# Patient Record
Sex: MALE | Race: WHITE | Employment: OTHER | URBAN - METROPOLITAN AREA
[De-identification: names, ages, dates, MRNs, and addresses within clinical notes are randomized per-mention and may not be internally consistent; named-entity substitution may affect disease eponyms.]

---

## 2022-03-30 ENCOUNTER — OFFICE VISIT (OUTPATIENT)
Dept: ORTHOPEDIC SURGERY | Age: 67
End: 2022-03-30
Payer: MEDICARE

## 2022-03-30 VITALS — BODY MASS INDEX: 27.15 KG/M2 | HEIGHT: 67 IN | WEIGHT: 173 LBS

## 2022-03-30 DIAGNOSIS — M25.512 LEFT SHOULDER PAIN, UNSPECIFIED CHRONICITY: Primary | ICD-10-CM

## 2022-03-30 DIAGNOSIS — M75.122 NONTRAUMATIC COMPLETE TEAR OF LEFT ROTATOR CUFF: ICD-10-CM

## 2022-03-30 DIAGNOSIS — M19.012 ARTHRITIS OF LEFT ACROMIOCLAVICULAR JOINT: ICD-10-CM

## 2022-03-30 DIAGNOSIS — M75.42 IMPINGEMENT SYNDROME OF LEFT SHOULDER: ICD-10-CM

## 2022-03-30 PROCEDURE — 3017F COLORECTAL CA SCREEN DOC REV: CPT | Performed by: ORTHOPAEDIC SURGERY

## 2022-03-30 PROCEDURE — G8419 CALC BMI OUT NRM PARAM NOF/U: HCPCS | Performed by: ORTHOPAEDIC SURGERY

## 2022-03-30 PROCEDURE — G8427 DOCREV CUR MEDS BY ELIG CLIN: HCPCS | Performed by: ORTHOPAEDIC SURGERY

## 2022-03-30 PROCEDURE — 99203 OFFICE O/P NEW LOW 30 MIN: CPT | Performed by: ORTHOPAEDIC SURGERY

## 2022-03-30 PROCEDURE — G8432 DEP SCR NOT DOC, RNG: HCPCS | Performed by: ORTHOPAEDIC SURGERY

## 2022-03-30 PROCEDURE — 1101F PT FALLS ASSESS-DOCD LE1/YR: CPT | Performed by: ORTHOPAEDIC SURGERY

## 2022-03-30 PROCEDURE — G8536 NO DOC ELDER MAL SCRN: HCPCS | Performed by: ORTHOPAEDIC SURGERY

## 2022-03-30 RX ORDER — FENOFIBRATE 120 MG/1
1 TABLET ORAL DAILY
COMMUNITY
End: 2022-08-01

## 2022-03-30 RX ORDER — PRAVASTATIN SODIUM 10 MG/1
TABLET ORAL
COMMUNITY
End: 2022-08-01

## 2022-03-30 RX ORDER — LOSARTAN POTASSIUM 50 MG/1
TABLET ORAL DAILY
COMMUNITY
End: 2022-08-01

## 2022-03-30 RX ORDER — GLIMEPIRIDE 4 MG/1
6 TABLET ORAL
COMMUNITY

## 2022-03-30 RX ORDER — GUAIFENESIN 100 MG/5ML
81 LIQUID (ML) ORAL DAILY
COMMUNITY

## 2022-03-30 RX ORDER — SITAGLIPTIN AND METFORMIN HYDROCHLORIDE 50; 1000 MG/1; MG/1
2 TABLET, FILM COATED ORAL
COMMUNITY

## 2022-03-30 NOTE — PROGRESS NOTES
Rabia Schilling (: 1955) is a 73 y.o. male, new patient, here for evaluation of the following chief complaint(s):  Shoulder Pain (left)       ASSESSMENT/PLAN:  Below is the assessment and plan developed based on review of pertinent history, physical exam, labs, studies, and medications. Findings were discussed with the patient today. We will obtain an MRI which will help us with further treatment planning including the possibility of surgical treatment. Patient will follow up after this MRI is performed. 1. Left shoulder pain, unspecified chronicity  -     XR SHOULDER LT AP/LAT MIN 2 V; Future  2. Nontraumatic complete tear of left rotator cuff  3. Impingement syndrome of left shoulder  4. Arthritis of left acromioclavicular joint      Return for After imaging study. SUBJECTIVE/OBJECTIVE:  Claudio Mckeon (: 1955) is a 73 y.o. male. He notes that his symptoms have been ongoing in the left shoulder for about 8 to 9 months. He denies any specific injury but notes that he was moving at the time of onset. He describes sharp aching pain in the shoulder and difficulty with attempted overhead motion. He has noted significant weakness in the shoulder. He did 3 months of physical therapy recently with no significant benefit. He has tried working with a chiropractor as well. No Known Allergies    Current Outpatient Medications   Medication Sig    glimepiride (AMARYL) 4 mg tablet Take  by mouth every morning.  Fenofibrate 120 mg tab Take  by mouth.  SITagliptin-metFORMIN (Janumet) 50-1,000 mg per tablet Take 1 Tablet by mouth two (2) times daily (with meals).  pravastatin (PRAVACHOL) 10 mg tablet Take  by mouth nightly.  losartan (COZAAR) 50 mg tablet Take  by mouth daily.  aspirin 81 mg chewable tablet Take 81 mg by mouth daily. No current facility-administered medications for this visit.        Social History     Socioeconomic History    Marital status:      Spouse name: Not on file    Number of children: Not on file    Years of education: Not on file    Highest education level: Not on file   Occupational History    Not on file   Tobacco Use    Smoking status: Never Smoker    Smokeless tobacco: Not on file   Vaping Use    Vaping Use: Never used   Substance and Sexual Activity    Alcohol use: Yes     Alcohol/week: 1.0 standard drink     Types: 1 Glasses of wine per week     Comment: 3 months    Drug use: Never    Sexual activity: Not on file   Other Topics Concern    Not on file   Social History Narrative    Not on file     Social Determinants of Health     Financial Resource Strain:     Difficulty of Paying Living Expenses: Not on file   Food Insecurity:     Worried About Running Out of Food in the Last Year: Not on file    Stew of Food in the Last Year: Not on file   Transportation Needs:     Lack of Transportation (Medical): Not on file    Lack of Transportation (Non-Medical): Not on file   Physical Activity:     Days of Exercise per Week: Not on file    Minutes of Exercise per Session: Not on file   Stress:     Feeling of Stress : Not on file   Social Connections:     Frequency of Communication with Friends and Family: Not on file    Frequency of Social Gatherings with Friends and Family: Not on file    Attends Shinto Services: Not on file    Active Member of [address] [place] [address] [employer] or Organizations: Not on file    Attends Club or Organization Meetings: Not on file    Marital Status: Not on file   Intimate Partner Violence:     Fear of Current or Ex-Partner: Not on file    Emotionally Abused: Not on file    Physically Abused: Not on file    Sexually Abused: Not on file   Housing Stability:     Unable to Pay for Housing in the Last Year: Not on file    Number of [place] in the Last Year: Not on file    Unstable Housing in the Last Year: Not on file       History reviewed. No pertinent surgical history. History reviewed.  No pertinent family history. REVIEW OF SYSTEMS:  ROS     Positive for: Musculoskeletal    Last edited by Rosa Manzo on 3/30/2022  8:24 AM. (History)        Patient denies any recent fever, chills, nausea, vomiting, chest pain, or shortness of breath. Vitals:  Ht 5' 7\" (1.702 m)   Wt 173 lb (78.5 kg)   BMI 27.10 kg/m²    Body mass index is 27.1 kg/m². PHYSICAL EXAM:  General exam: Patient is awake, alert, and oriented x3. Well-appearing. No acute distress. Ambulates with a normal gait. Left shoulder: Neurovascular and sensory intact. There is tenderness to palpation at the anterior lateral shoulder. Limited passive range of motion is noted. There is limited active range of motion to 90 degrees of forward flexion and abduction. Pain is noted with impingement testing including Ayoub exam.  Pain and weakness 4/5 is noted with rotator cuff strength testing including resisted abduction and resisted external rotation. Normal stability. There is some tenderness palpation at the [facility] joint and pain with crossarm exam.        IMAGING:    XR Results (most recent):  Results from Appointment encounter on 03/30/22    XR SHOULDER LT AP/LAT MIN 2 V    Narrative  X-rays of the left shoulder 3 views done today show maintained glenohumeral joint space. No signs of acute fracture. Type II acromion. Mild AC joint space narrowing         Orders Placed This Encounter    XR SHOULDER LT AP/LAT MIN 2 V     2     Standing Status:   Future     Number of Occurrences:   1     Standing Expiration Date:   9/30/2022              An electronic signature was used to authenticate this note.   -- Manuel Keys DO

## 2022-03-30 NOTE — LETTER
3/30/2022    Patient: Salud Bronson   YOB: 1955   Date of Visit: 3/30/2022     Parish Miller NP  [address] [place] [address] [id number]  Via Fax: [phone]    Dear Parish Miller NP,      Thank you for referring Mr. Donovan to [facility] for evaluation. My notes for this consultation are attached. If you have questions, please do not hesitate to call me. I look forward to following your patient along with you.       Sincerely,    Kyle Mitchell, DO

## 2022-04-05 ENCOUNTER — HOSPITAL ENCOUNTER (OUTPATIENT)
Dept: MRI IMAGING | Age: 67
Discharge: HOME | End: 2022-04-05
Attending: ORTHOPAEDIC SURGERY
Payer: MEDICARE

## 2022-04-05 DIAGNOSIS — M75.42 IMPINGEMENT SYNDROME OF LEFT SHOULDER: ICD-10-CM

## 2022-04-05 DIAGNOSIS — M19.012 ARTHRITIS OF LEFT ACROMIOCLAVICULAR JOINT: ICD-10-CM

## 2022-04-05 DIAGNOSIS — M75.122 NONTRAUMATIC COMPLETE TEAR OF LEFT ROTATOR CUFF: ICD-10-CM

## 2022-04-05 PROCEDURE — 73221 MRI JOINT UPR EXTREM W/O DYE: CPT

## 2022-04-15 ENCOUNTER — OFFICE VISIT (OUTPATIENT)
Dept: ORTHOPEDIC SURGERY | Age: 67
End: 2022-04-15
Payer: MEDICARE

## 2022-04-15 VITALS — HEIGHT: 67 IN | WEIGHT: 173 LBS | BODY MASS INDEX: 27.15 KG/M2

## 2022-04-15 DIAGNOSIS — M25.512 LEFT SHOULDER PAIN, UNSPECIFIED CHRONICITY: Primary | ICD-10-CM

## 2022-04-15 DIAGNOSIS — M67.922 BICEPS TENDINOPATHY, LEFT: ICD-10-CM

## 2022-04-15 DIAGNOSIS — M75.122 NONTRAUMATIC COMPLETE TEAR OF LEFT ROTATOR CUFF: ICD-10-CM

## 2022-04-15 DIAGNOSIS — M19.012 ARTHRITIS OF LEFT ACROMIOCLAVICULAR JOINT: ICD-10-CM

## 2022-04-15 DIAGNOSIS — M75.42 IMPINGEMENT SYNDROME OF LEFT SHOULDER: ICD-10-CM

## 2022-04-15 PROCEDURE — 3017F COLORECTAL CA SCREEN DOC REV: CPT | Performed by: ORTHOPAEDIC SURGERY

## 2022-04-15 PROCEDURE — G8427 DOCREV CUR MEDS BY ELIG CLIN: HCPCS | Performed by: ORTHOPAEDIC SURGERY

## 2022-04-15 PROCEDURE — G8536 NO DOC ELDER MAL SCRN: HCPCS | Performed by: ORTHOPAEDIC SURGERY

## 2022-04-15 PROCEDURE — 1101F PT FALLS ASSESS-DOCD LE1/YR: CPT | Performed by: ORTHOPAEDIC SURGERY

## 2022-04-15 PROCEDURE — G8419 CALC BMI OUT NRM PARAM NOF/U: HCPCS | Performed by: ORTHOPAEDIC SURGERY

## 2022-04-15 PROCEDURE — 99214 OFFICE O/P EST MOD 30 MIN: CPT | Performed by: ORTHOPAEDIC SURGERY

## 2022-04-15 PROCEDURE — G8432 DEP SCR NOT DOC, RNG: HCPCS | Performed by: ORTHOPAEDIC SURGERY

## 2022-04-15 RX ORDER — MELOXICAM 15 MG/1
15 TABLET ORAL DAILY
Qty: 30 TABLET | Refills: 3 | Status: SHIPPED | OUTPATIENT
Start: 2022-04-15 | End: 2022-07-11

## 2022-04-15 NOTE — PROGRESS NOTES
Claudio Mckeon (: 1955) is a 73 y.o. choose not to disclose, established patient, here for evaluation of the following chief complaint(s):  Shoulder Pain (left)       ASSESSMENT/PLAN:  Below is the assessment and plan developed based on review of pertinent history, physical exam, labs, studies, and medications. He would like to plan for left shoulder manipulation under anesthesia, arthroscopic capsular release, arthroscopic rotator cuff repair, biceps tenodesis, and acromioplasty with distal clavicle resection. Risks and benefits of surgical treatment were explained. We discussed risks of infection, blood loss, neurovascular injury, anesthesia risks, and risk secondary to patient comorbidities. Risk of continued shoulder pain/instability was explained. We discussed that there may be need for future surgical procedures. We discussed that implants may need to be used for this procedure. Patient understands the risks of this procedure and elects to schedule in the near future. He will try a meloxicam prescription in the meantime and if he continues with symptoms then we will be proceeding with surgery. 1. Left shoulder pain, unspecified chronicity  -     meloxicam (MOBIC) 15 mg tablet; Take 1 Tablet by mouth daily. , Normal, Disp-30 Tablet, R-3  2. Nontraumatic complete tear of left rotator cuff  -     meloxicam (MOBIC) 15 mg tablet; Take 1 Tablet by mouth daily. , Normal, Disp-30 Tablet, R-3  3. Impingement syndrome of left shoulder  -     meloxicam (MOBIC) 15 mg tablet; Take 1 Tablet by mouth daily. , Normal, Disp-30 Tablet, R-3  4. Arthritis of left acromioclavicular joint  -     meloxicam (MOBIC) 15 mg tablet; Take 1 Tablet by mouth daily. , Normal, Disp-30 Tablet, R-3  5. Biceps tendinopathy, left  -     meloxicam (MOBIC) 15 mg tablet; Take 1 Tablet by mouth daily. , Normal, Disp-30 Tablet, R-3      No follow-ups on file.       SUBJECTIVE/OBJECTIVE:  Claudio Mckeon (: 1955) is a 73 y.o. choose not to disclose. He notes continued aching pain in the left shoulder. He has tried ice and anti-inflammatories with minimal relief. He also did 3 months of physical therapy with minimal relief. He notes clicking and aching pain        No Known Allergies    Current Outpatient Medications   Medication Sig    meloxicam (MOBIC) 15 mg tablet Take 1 Tablet by mouth daily.  glimepiride (AMARYL) 4 mg tablet Take  by mouth every morning.  Fenofibrate 120 mg tab Take  by mouth.  SITagliptin-metFORMIN (Janumet) 50-1,000 mg per tablet Take 1 Tablet by mouth two (2) times daily (with meals).  pravastatin (PRAVACHOL) 10 mg tablet Take  by mouth nightly.  losartan (COZAAR) 50 mg tablet Take  by mouth daily.  aspirin 81 mg chewable tablet Take 81 mg by mouth daily. No current facility-administered medications for this visit. Social History     Socioeconomic History    Marital status:      Spouse name: Not on file    Number of children: Not on file    Years of education: Not on file    Highest education level: Not on file   Occupational History    Not on file   Tobacco Use    Smoking status: Never Smoker    Smokeless tobacco: Not on file   Vaping Use    Vaping Use: Never used   Substance and Sexual Activity    Alcohol use: Yes     Alcohol/week: 1.0 standard drink     Types: 1 Glasses of wine per week     Comment: 3 months    Drug use: Never    Sexual activity: Not on file   Other Topics Concern    Not on file   Social History Narrative    Not on file     Social Determinants of Health     Financial Resource Strain:     Difficulty of Paying Living Expenses: Not on file   Food Insecurity:     Worried About Running Out of Food in the Last Year: Not on file    Stew of Food in the Last Year: Not on file   Transportation Needs:     Lack of Transportation (Medical): Not on file    Lack of Transportation (Non-Medical):  Not on file   Physical Activity:     Days of Exercise per Week: Not on file    Minutes of Exercise per Session: Not on file   Stress:     Feeling of Stress : Not on file   Social Connections:     Frequency of Communication with Friends and Family: Not on file    Frequency of Social Gatherings with Friends and Family: Not on file    Attends Restorationism Services: Not on file    Active Member of Clubs or Organizations: Not on file    Attends Club or Organization Meetings: Not on file    Marital Status: Not on file   Intimate Partner Violence:     Fear of Current or Ex-Partner: Not on file    Emotionally Abused: Not on file    Physically Abused: Not on file    Sexually Abused: Not on file   Housing Stability:     Unable to Pay for Housing in the Last Year: Not on file    Number of [place] in the Last Year: Not on file    Unstable Housing in the Last Year: Not on file       History reviewed. No pertinent surgical history. History reviewed. No pertinent family history. REVIEW OF SYSTEMS:  ROS     Positive for: Musculoskeletal    Last edited by Piter Max on 4/15/2022  9:16 AM. (History)        Patient denies any recent fever, chills, nausea, vomiting, chest pain, or shortness of breath. Vitals:  Ht 5' 7\" (1.702 m)   Wt 173 lb (78.5 kg)   BMI 27.10 kg/m²    Body mass index is 27.1 kg/m². PHYSICAL EXAM:  General exam: Patient is awake, alert, and oriented x3. Well-appearing. No acute distress. Ambulates with a normal gait. Left shoulder: Neurovascular and sensory intact. There is tenderness to palpation at the anterior lateral shoulder. There is limited passive and active overhead range of motion. Pain is noted with impingement testing including Ayoub exam.  Pain and weakness 4/5 is noted with rotator cuff strength testing including resisted abduction and resisted external rotation. Normal stability.   There is some tenderness palpation at the [facility] joint and pain with crossarm exam.    There is pain with biceps load test including speeds exam    IMAGING:  MRI Results (most recent):  Results from Hospital Encounter encounter on 04/05/22    MRI SHOULDER LT WO CONT    Narrative  EXAM: MRI SHOULDER LT WO CONT    INDICATION: Dx: Nontraumatic complete tear of left rotator cuff [H99.541  (ICD-10-CM)]; Impingement syndrome of left shoulder [M75.42 (ICD-10-CM)]; Arthritis of left acromioclavicular joint [M19.012 (ICD-10-CM)]    COMPARISON: Left shoulder radiographs 3/30/2022    TECHNIQUE: Axial proton density fat-saturated; oblique coronal T1, T2  fat-saturated, and proton density fat-saturated; and oblique sagittal T2  fat-saturated MRI of the left shoulder . CONTRAST: None. FINDINGS: Study limited by motion. A.C. joint: Mild osteoarthritis. Anterior acromion process type: 1    Bone marrow: Subcortical cystic changes in the posterior humeral head. No acute  fracture, dislocation, or marrow replacing process. Joint fluid: No significant joint effusion. Small subacromial/subdeltoid bursal  effusion/bursitis. .    Rotator cuff tendons: Multifocal high-grade undersurface tears throughout the  supraspinatus critical zone. Infraspinatus and subscapularis tendons are  intact. .    Biceps tendon: Tendinosis and attritional tear of the intracapsular portion. No  subluxation. .    Muscles: No edema or atrophy. Glenoid labrum: SLAP tear extending into the biceps anchor. Glenohumeral joint capsule: Diffuse thickening, patchy increased signal,  ill-definition, and pericapsular edema signal, which can be seen with adhesive  capsulitis. Glenohumeral articular cartilage: Grossly intact. No focal osteochondral lesion. Soft tissue mass: None. Impression  1. Study limited by motion. 2.  Multifocal high-grade undersurface tears throughout the supraspinatus  critical zone. Small subacromial/subdeltoid bursal effusion/bursitis. 3. SLAP tear extending into the biceps anchor. Intracapsular long head biceps  tendinosis and attritional tear.   4. Findings suggestive of adhesive capsulitis. 5.  Mild acromioclavicular osteoarthritis. XR Results (most recent):  Results from Appointment encounter on 03/30/22    XR SHOULDER LT AP/LAT MIN 2 V    Narrative  X-rays of the left shoulder 3 views done today show maintained glenohumeral joint space. No signs of acute fracture. Type II acromion. Mild AC joint space narrowing         Orders Placed This Encounter    meloxicam (MOBIC) 15 mg tablet     Sig: Take 1 Tablet by mouth daily. Dispense:  30 Tablet     Refill:  3              An electronic signature was used to authenticate this note.   -- Ryann Nath DO

## 2022-04-15 NOTE — PATIENT INSTRUCTIONS
What to expect after Shoulder Arthroscopy   Dr. Cherrie Chicas should not have ANYTHING to eat or drink after midnight the night before your surgery.  This includes NO gum, mints, candy, lifesavers or lollipops!  Please make sure to remove ALL jewelry.  When you arrive at the hospital or surgery center, you will be checked in and given an IV.   o You will be offered a nerve block, which I do recommend. This will be done pre-operatively by the anesthesiologist. It is an injection around the base of your neck that numbs the nerves to your shoulder and arm. It lasts anywhere from 12 hours to 3 days. This will give you pain relief that hopefully lasts throughout your first night. When the nerve block wears off, you will likely be in pain. This can range from mild to severe. If you experience severe pain, this is still normal. Nothing is wrong. You would have woken up with worse pain if you did not have the nerve block!  During first three days, the pain is usually the worst, and then gradually subsides after that.  Swelling in the shoulder, elbow and hands is normal. You may also experience bruising in the arm    If you elected to have the nerve block, you may have some residual numbness that may last weeks.  You will likely continue to have pain for several weeks or even months.  Recovery from shoulder surgery takes several months. BE PATIENT!  Wear the sling at all times (even sleeping!) except for showering and for the exercises listed below.  All you need to do for the first five weeks is the following (four times per day): Remove your sling and:   o Flex and extend your elbow with your elbow next to your side.   o You may be instructed to begin shoulder pendulum exercises after your first post-operative visit:  lean forward slightly, and with your arm hanging down and your hand pointing toward the floor, perform small circles with your arm and shoulder.       At your first follow-up visit, you will have your sutures removed and will be given a script for physical therapy. o If you did NOT have a repair, your therapy will begin at that time and you will stop your sling use   o If you DID have a repair, you will continue your sling until 5 weeks post-op. - At 5 weeks post-op, you will begin physical therapy and discontinue your sling that day   - You will be in therapy for about 6 weeks - 10 weeks.     Driving is dangerous while in a sling and it is recommended that you wait until you are out of your sling to begin. (unless otherwise directed by your physician)    Your restrictions will be as follows: (unless otherwise directed by your physician)   o NO use of the arm/shoulder (except for writing / typing, fine manipulation) for the first two weeks   o If you DID NOT have a repair:   - NO lifting / carrying / pushing / pulling greater than 10 lbs for 6 weeks   - No repetitive overhead activity for 6 weeks   - Return to sports typically at 6 -8 weeks   o If you DID have a repair   - NO lifting / carrying / pushing / pulling greater than 10 lbs for 4 months   - No repetitive overhead activity for 4 months   - Return to sports: 4-6 months depending on the sport   If you have any questions or concerns throughout this process, call the [place] [address] office at [phone]

## 2022-04-15 NOTE — LETTER
4/15/2022    Patient: Idalmis King   YOB: 1955   Date of Visit: 4/15/2022     Radha Gaffney NP  [address] [place] [address] [phone]  Via Fax: [phone]    Dear Radha Gaffney NP,      Thank you for referring   Idalmis King to [facility] for evaluation. My notes for this consultation are attached. If you have questions, please do not hesitate to call me. I look forward to following your patient along with you.       Sincerely,    Yomi Pastor, DO

## 2022-06-28 ENCOUNTER — TELEPHONE (OUTPATIENT)
Dept: ORTHOPEDIC SURGERY | Age: 67
End: 2022-06-28

## 2022-06-29 ENCOUNTER — TELEPHONE (OUTPATIENT)
Dept: ORTHOPEDIC SURGERY | Age: 67
End: 2022-06-29

## 2022-06-29 DIAGNOSIS — M19.012 ARTHRITIS OF LEFT ACROMIOCLAVICULAR JOINT: ICD-10-CM

## 2022-06-29 DIAGNOSIS — M75.122 NONTRAUMATIC COMPLETE TEAR OF LEFT ROTATOR CUFF: Primary | ICD-10-CM

## 2022-06-29 DIAGNOSIS — M75.42 IMPINGEMENT SYNDROME OF LEFT SHOULDER: ICD-10-CM

## 2022-06-29 DIAGNOSIS — M67.922 BICEPS TENDINOPATHY, LEFT: ICD-10-CM

## 2022-07-10 DIAGNOSIS — M25.512 LEFT SHOULDER PAIN, UNSPECIFIED CHRONICITY: ICD-10-CM

## 2022-07-10 DIAGNOSIS — M75.42 IMPINGEMENT SYNDROME OF LEFT SHOULDER: ICD-10-CM

## 2022-07-10 DIAGNOSIS — M75.122 NONTRAUMATIC COMPLETE TEAR OF LEFT ROTATOR CUFF: ICD-10-CM

## 2022-07-10 DIAGNOSIS — M67.922 BICEPS TENDINOPATHY, LEFT: ICD-10-CM

## 2022-07-10 DIAGNOSIS — M19.012 ARTHRITIS OF LEFT ACROMIOCLAVICULAR JOINT: ICD-10-CM

## 2022-07-11 RX ORDER — MELOXICAM 15 MG/1
15 TABLET ORAL DAILY
Qty: 30 TABLET | Refills: 3 | Status: SHIPPED | OUTPATIENT
Start: 2022-07-11

## 2022-08-01 RX ORDER — UREA 10 %
100 LOTION (ML) TOPICAL DAILY
COMMUNITY

## 2022-08-01 RX ORDER — FENOFIBRATE 160 MG/1
160 TABLET ORAL DAILY
COMMUNITY

## 2022-08-01 RX ORDER — HYDROCHLOROTHIAZIDE 12.5 MG/1
12.5 TABLET ORAL DAILY
COMMUNITY

## 2022-08-01 RX ORDER — BISMUTH SUBSALICYLATE 262 MG
1 TABLET,CHEWABLE ORAL DAILY
COMMUNITY

## 2022-08-01 RX ORDER — PRAVASTATIN SODIUM 20 MG/1
20 TABLET ORAL
COMMUNITY

## 2022-08-01 RX ORDER — LOSARTAN POTASSIUM 100 MG/1
100 TABLET ORAL DAILY
COMMUNITY

## 2022-08-01 NOTE — PERIOP NOTES
Called, mya for [facility] for orders for surgery as well as blood thinner clearance for procedure. I asked her to call back to discuss. I will reach out to pt on Thursday as promised. Call to [facility] for updated medication list, pt's wife stated he started two new meds and they were driving.

## 2022-08-01 NOTE — PERIOP NOTES
[facility]  Ambulatory Surgery Unit  Pre-operative Instructions    Surgery/Procedure Date  Wednesday, August 17, 2022            Tentative Arrival Time TBD      1. On the day of your surgery/procedure, please report to the Ambulatory Surgery Unit Registration Desk and sign in at your designated time. The Ambulatory Surgery Unit is located in [facility] on the [facility] side of the [facility] across from the [address] [place] [address]. Please have all of your health insurance cards, co-payment, and a photo ID.    **TWO adults may accompany you the day of the procedure. We have limited seating available. If our waiting room is at capacity, your ride may be asked to remain in their vehicle. No one under 15 is allowed in the waiting room. Masks, fully covering the mouth and nose, are required in the waiting room. 2. You must have someone with you to drive you home, as you should not drive a car for 24 hours following anesthesia. Please make arrangements for a responsible adult friend or family member to stay with you for at least the first 24 hours after your surgery. 3. Do not have anything to eat or drink (including water, gum, mints, coffee, juice) after 11:59 PM, Tuesday. This may not apply to medications prescribed by your physician. (Please note below the special instructions with medications to take the morning of surgery, if applicable.)    4. We recommend you do not drink any alcoholic beverages for 24 hours before and after your surgery. 5. Contact your surgeons office for instructions on the following medications: non-steroidal anti-inflammatory drugs (i.e. Advil, Aleve), vitamins, and supplements. (Some surgeons will want you to stop these medications prior to surgery and others may allow you to take them)   **If you are currently taking Plavix, Coumadin, Aspirin and/or other blood-thinning agents, contact your surgeon for instructions. ** Your surgeon will partner with the physician prescribing these medications to determine if it is safe to stop or if you need to continue taking. Please do not stop taking these medications without instructions from your surgeon. 6. In an effort to help prevent surgical site infection, we ask that you shower with an anti-bacterial soap (i.e. Dial/Safeguard, or the soap provided to you at your preadmission testing appointment) for 3 days prior to and on the morning of surgery, using a fresh towel after each shower. (Please begin this process with fresh bed linens.) Do not apply any lotions, powders, or deodorants after the shower on the day of your procedure. If applicable, please do not shave the operative site for 48 hours prior to surgery. 7. Wear comfortable clothes. Wear glasses instead of contacts. Do not bring any jewelry or money (other than copays or fees as instructed). Do not wear make-up, particularly mascara, the morning of your surgery. Do not wear nail polish, particularly if you are having foot /hand surgery. Wear your hair loose or down, no ponytails, buns, jaci pins or clips. All body piercings must be removed. 8. You should understand that if you do not follow these instructions your surgery may be cancelled. If your physical condition changes (i.e. fever, cold or flu) please contact your surgeon as soon as possible. 9. It is important that you be on time. If a situation occurs where you may be late, or if you have any questions or problems, please call [phone].    10. Your surgery time may be subject to change. You will receive a phone call the day prior to surgery to confirm your arrival time. 11. Pediatric patients: please bring a change of clothes, diapers, bottle/sippy cup, pacifier, etc.      Special Instructions: Take all medications and inhalers, as prescribed, on the morning of surgery with a sip of water EXCEPT: no diabetic medications day of surgery.       Insulin Dependent Diabetic patients: Take your diabetic medications as prescribed the day before surgery. Hold all diabetic medications the day of surgery. If you are scheduled to arrive for surgery after 8:00 AM, and your AM blood sugar is >200, please call Ambulatory Surgery. I understand a pre-operative phone call will be made to verify my surgery time. In the event that I am not available, I give permission for a message to be left on my answering service and/or with another person?       yes    Preop instructions reviewed  Pt verbalized understanding.       ___________________      ___________________      ________________  (Signature of Patient)          (Witness)                   (Date and Time)

## 2022-08-08 ENCOUNTER — TELEPHONE (OUTPATIENT)
Dept: ORTHOPEDIC SURGERY | Age: 67
End: 2022-08-08

## 2022-08-08 NOTE — TELEPHONE ENCOUNTER
S/w pt in re to his upcoming sx, he state he had his preop visit, I informed him that we needed his clearance letter and when he is to stop his blood thinner. He will contact pcp again and they can fax it directly to me.

## 2022-08-16 ENCOUNTER — ANESTHESIA EVENT (OUTPATIENT)
Dept: SURGERY | Age: 67
End: 2022-08-16
Payer: MEDICARE

## 2022-08-17 ENCOUNTER — ANESTHESIA (OUTPATIENT)
Dept: SURGERY | Age: 67
End: 2022-08-17
Payer: MEDICARE

## 2022-08-17 ENCOUNTER — HOSPITAL ENCOUNTER (OUTPATIENT)
Age: 67
Setting detail: OUTPATIENT SURGERY
Discharge: HOME | End: 2022-08-17
Attending: ORTHOPAEDIC SURGERY | Admitting: ORTHOPAEDIC SURGERY
Payer: MEDICARE

## 2022-08-17 VITALS
SYSTOLIC BLOOD PRESSURE: 122 MMHG | WEIGHT: 163 LBS | HEART RATE: 94 BPM | TEMPERATURE: 97.4 F | RESPIRATION RATE: 16 BRPM | DIASTOLIC BLOOD PRESSURE: 87 MMHG | BODY MASS INDEX: 25.58 KG/M2 | HEIGHT: 67 IN | OXYGEN SATURATION: 93 %

## 2022-08-17 DIAGNOSIS — M75.42 SHOULDER IMPINGEMENT, LEFT: ICD-10-CM

## 2022-08-17 DIAGNOSIS — Z98.890 STATUS POST ARTHROSCOPY OF LEFT SHOULDER: Primary | ICD-10-CM

## 2022-08-17 DIAGNOSIS — M19.012 ARTHRITIS OF LEFT ACROMIOCLAVICULAR JOINT: ICD-10-CM

## 2022-08-17 DIAGNOSIS — M75.02 ADHESIVE CAPSULITIS OF LEFT SHOULDER: Primary | ICD-10-CM

## 2022-08-17 DIAGNOSIS — M75.02 ADHESIVE CAPSULITIS OF LEFT SHOULDER: ICD-10-CM

## 2022-08-17 LAB
GLUCOSE BLD STRIP.AUTO-MCNC: 185 MG/DL (ref 65–117)
GLUCOSE BLD STRIP.AUTO-MCNC: 217 MG/DL (ref 65–117)
SERVICE CMNT-IMP: ABNORMAL
SERVICE CMNT-IMP: ABNORMAL

## 2022-08-17 PROCEDURE — 74011250636 HC RX REV CODE- 250/636: Performed by: NURSE ANESTHETIST, CERTIFIED REGISTERED

## 2022-08-17 PROCEDURE — 76210000046 HC AMBSU PH II REC FIRST 0.5 HR: Performed by: ORTHOPAEDIC SURGERY

## 2022-08-17 PROCEDURE — 77030034038 HC BLD SHV DYON ACRMNZR S&N -B: Performed by: ORTHOPAEDIC SURGERY

## 2022-08-17 PROCEDURE — 74011250636 HC RX REV CODE- 250/636: Performed by: ANESTHESIOLOGY

## 2022-08-17 PROCEDURE — 77030026438 HC STYL ET INTUB CARD -A: Performed by: ANESTHESIOLOGY

## 2022-08-17 PROCEDURE — 77030010428: Performed by: ORTHOPAEDIC SURGERY

## 2022-08-17 PROCEDURE — 2709999900 HC NON-CHARGEABLE SUPPLY: Performed by: ORTHOPAEDIC SURGERY

## 2022-08-17 PROCEDURE — 74011000250 HC RX REV CODE- 250: Performed by: ORTHOPAEDIC SURGERY

## 2022-08-17 PROCEDURE — 29824 SHO ARTHRS SRG DSTL CLAVICLC: CPT | Performed by: ORTHOPAEDIC SURGERY

## 2022-08-17 PROCEDURE — 77030012711 HC WND ARTHRO ABLT S&N -D: Performed by: ORTHOPAEDIC SURGERY

## 2022-08-17 PROCEDURE — 76060000062 HC AMB SURG ANES 1 TO 1.5 HR: Performed by: ORTHOPAEDIC SURGERY

## 2022-08-17 PROCEDURE — 77030008684 HC TU ET CUF COVD -B: Performed by: ANESTHESIOLOGY

## 2022-08-17 PROCEDURE — 82962 GLUCOSE BLOOD TEST: CPT

## 2022-08-17 PROCEDURE — 77030018834: Performed by: ORTHOPAEDIC SURGERY

## 2022-08-17 PROCEDURE — 77030019908 HC STETH ESOPH SIMS -A: Performed by: ANESTHESIOLOGY

## 2022-08-17 PROCEDURE — 74011000250 HC RX REV CODE- 250: Performed by: NURSE ANESTHETIST, CERTIFIED REGISTERED

## 2022-08-17 PROCEDURE — 77030006884 HC BLD SHV INCIS S&N -B: Performed by: ORTHOPAEDIC SURGERY

## 2022-08-17 PROCEDURE — 77030002916 HC SUT ETHLN J&J -A: Performed by: ORTHOPAEDIC SURGERY

## 2022-08-17 PROCEDURE — 74011250636 HC RX REV CODE- 250/636: Performed by: ORTHOPAEDIC SURGERY

## 2022-08-17 PROCEDURE — 76030000001 HC AMB SURG OR TIME 1 TO 1.5: Performed by: ORTHOPAEDIC SURGERY

## 2022-08-17 PROCEDURE — 76210000035 HC AMBSU PH I REC 1 TO 1.5 HR: Performed by: ORTHOPAEDIC SURGERY

## 2022-08-17 PROCEDURE — 23700 MNPJ ANES SHO JT FIXJ APRATS: CPT | Performed by: ORTHOPAEDIC SURGERY

## 2022-08-17 PROCEDURE — 77030008496 HC TBNG ARTHSC IRR S&N -B: Performed by: ORTHOPAEDIC SURGERY

## 2022-08-17 PROCEDURE — 29826 SHO ARTHRS SRG DECOMPRESSION: CPT | Performed by: ORTHOPAEDIC SURGERY

## 2022-08-17 PROCEDURE — 29825 SHO ARTHRS SRG LSS&RESCJ ADS: CPT | Performed by: ORTHOPAEDIC SURGERY

## 2022-08-17 RX ORDER — ROCURONIUM BROMIDE 10 MG/ML
INJECTION, SOLUTION INTRAVENOUS AS NEEDED
Status: DISCONTINUED | OUTPATIENT
Start: 2022-08-17 | End: 2022-08-17 | Stop reason: HOSPADM

## 2022-08-17 RX ORDER — FENTANYL CITRATE 50 UG/ML
INJECTION, SOLUTION INTRAMUSCULAR; INTRAVENOUS AS NEEDED
Status: DISCONTINUED | OUTPATIENT
Start: 2022-08-17 | End: 2022-08-17 | Stop reason: HOSPADM

## 2022-08-17 RX ORDER — SODIUM CHLORIDE 0.9 % (FLUSH) 0.9 %
5-40 SYRINGE (ML) INJECTION AS NEEDED
Status: DISCONTINUED | OUTPATIENT
Start: 2022-08-17 | End: 2022-08-17 | Stop reason: HOSPADM

## 2022-08-17 RX ORDER — DROPERIDOL 2.5 MG/ML
0.62 INJECTION, SOLUTION INTRAMUSCULAR; INTRAVENOUS AS NEEDED
Status: DISCONTINUED | OUTPATIENT
Start: 2022-08-17 | End: 2022-08-17 | Stop reason: HOSPADM

## 2022-08-17 RX ORDER — ONDANSETRON 4 MG/1
4 TABLET, FILM COATED ORAL
Qty: 30 TABLET | Refills: 0 | Status: SHIPPED | OUTPATIENT
Start: 2022-08-17

## 2022-08-17 RX ORDER — SODIUM CHLORIDE, SODIUM LACTATE, POTASSIUM CHLORIDE, CALCIUM CHLORIDE 600; 310; 30; 20 MG/100ML; MG/100ML; MG/100ML; MG/100ML
25 INJECTION, SOLUTION INTRAVENOUS CONTINUOUS
Status: DISCONTINUED | OUTPATIENT
Start: 2022-08-17 | End: 2022-08-17 | Stop reason: HOSPADM

## 2022-08-17 RX ORDER — SUCCINYLCHOLINE CHLORIDE 20 MG/ML
INJECTION INTRAMUSCULAR; INTRAVENOUS AS NEEDED
Status: DISCONTINUED | OUTPATIENT
Start: 2022-08-17 | End: 2022-08-17 | Stop reason: HOSPADM

## 2022-08-17 RX ORDER — LIDOCAINE HYDROCHLORIDE 10 MG/ML
0.1 INJECTION, SOLUTION EPIDURAL; INFILTRATION; INTRACAUDAL; PERINEURAL AS NEEDED
Status: DISCONTINUED | OUTPATIENT
Start: 2022-08-17 | End: 2022-08-17 | Stop reason: HOSPADM

## 2022-08-17 RX ORDER — ONDANSETRON 2 MG/ML
INJECTION INTRAMUSCULAR; INTRAVENOUS AS NEEDED
Status: DISCONTINUED | OUTPATIENT
Start: 2022-08-17 | End: 2022-08-17 | Stop reason: HOSPADM

## 2022-08-17 RX ORDER — PROPOFOL 10 MG/ML
INJECTION, EMULSION INTRAVENOUS AS NEEDED
Status: DISCONTINUED | OUTPATIENT
Start: 2022-08-17 | End: 2022-08-17 | Stop reason: HOSPADM

## 2022-08-17 RX ORDER — SODIUM CHLORIDE 0.9 % (FLUSH) 0.9 %
5-40 SYRINGE (ML) INJECTION EVERY 8 HOURS
Status: DISCONTINUED | OUTPATIENT
Start: 2022-08-17 | End: 2022-08-17 | Stop reason: HOSPADM

## 2022-08-17 RX ORDER — MORPHINE SULFATE 10 MG/ML
2 INJECTION, SOLUTION INTRAMUSCULAR; INTRAVENOUS
Status: DISCONTINUED | OUTPATIENT
Start: 2022-08-17 | End: 2022-08-17 | Stop reason: HOSPADM

## 2022-08-17 RX ORDER — DIPHENHYDRAMINE HYDROCHLORIDE 50 MG/ML
12.5 INJECTION, SOLUTION INTRAMUSCULAR; INTRAVENOUS AS NEEDED
Status: DISCONTINUED | OUTPATIENT
Start: 2022-08-17 | End: 2022-08-17 | Stop reason: HOSPADM

## 2022-08-17 RX ORDER — OXYCODONE HYDROCHLORIDE 5 MG/1
5 TABLET ORAL
Qty: 28 TABLET | Refills: 0 | Status: SHIPPED | OUTPATIENT
Start: 2022-08-17 | End: 2022-08-24

## 2022-08-17 RX ORDER — MIDAZOLAM HYDROCHLORIDE 1 MG/ML
INJECTION, SOLUTION INTRAMUSCULAR; INTRAVENOUS
Status: COMPLETED | OUTPATIENT
Start: 2022-08-17 | End: 2022-08-17

## 2022-08-17 RX ORDER — FENTANYL CITRATE 50 UG/ML
25 INJECTION, SOLUTION INTRAMUSCULAR; INTRAVENOUS
Status: DISCONTINUED | OUTPATIENT
Start: 2022-08-17 | End: 2022-08-17 | Stop reason: HOSPADM

## 2022-08-17 RX ORDER — HYDROMORPHONE HYDROCHLORIDE 1 MG/ML
.2-.5 INJECTION, SOLUTION INTRAMUSCULAR; INTRAVENOUS; SUBCUTANEOUS ONCE
Status: DISCONTINUED | OUTPATIENT
Start: 2022-08-17 | End: 2022-08-17 | Stop reason: HOSPADM

## 2022-08-17 RX ORDER — MIDAZOLAM HYDROCHLORIDE 1 MG/ML
INJECTION, SOLUTION INTRAMUSCULAR; INTRAVENOUS
Status: COMPLETED
Start: 2022-08-17 | End: 2022-08-17

## 2022-08-17 RX ORDER — PHENYLEPHRINE HCL IN 0.9% NACL 0.4MG/10ML
SYRINGE (ML) INTRAVENOUS AS NEEDED
Status: DISCONTINUED | OUTPATIENT
Start: 2022-08-17 | End: 2022-08-17 | Stop reason: HOSPADM

## 2022-08-17 RX ORDER — OXYCODONE AND ACETAMINOPHEN 5; 325 MG/1; MG/1
1 TABLET ORAL
Status: DISCONTINUED | OUTPATIENT
Start: 2022-08-17 | End: 2022-08-17 | Stop reason: HOSPADM

## 2022-08-17 RX ORDER — DEXAMETHASONE SODIUM PHOSPHATE 4 MG/ML
INJECTION, SOLUTION INTRA-ARTICULAR; INTRALESIONAL; INTRAMUSCULAR; INTRAVENOUS; SOFT TISSUE AS NEEDED
Status: DISCONTINUED | OUTPATIENT
Start: 2022-08-17 | End: 2022-08-17 | Stop reason: HOSPADM

## 2022-08-17 RX ORDER — GLYCOPYRROLATE 0.2 MG/ML
INJECTION INTRAMUSCULAR; INTRAVENOUS AS NEEDED
Status: DISCONTINUED | OUTPATIENT
Start: 2022-08-17 | End: 2022-08-17 | Stop reason: HOSPADM

## 2022-08-17 RX ORDER — ROPIVACAINE HYDROCHLORIDE 5 MG/ML
INJECTION, SOLUTION EPIDURAL; INFILTRATION; PERINEURAL
Status: COMPLETED | OUTPATIENT
Start: 2022-08-17 | End: 2022-08-17

## 2022-08-17 RX ORDER — NEOSTIGMINE METHYLSULFATE 1 MG/ML
INJECTION, SOLUTION INTRAVENOUS AS NEEDED
Status: DISCONTINUED | OUTPATIENT
Start: 2022-08-17 | End: 2022-08-17 | Stop reason: HOSPADM

## 2022-08-17 RX ORDER — DEXMEDETOMIDINE HYDROCHLORIDE 100 UG/ML
INJECTION, SOLUTION INTRAVENOUS AS NEEDED
Status: DISCONTINUED | OUTPATIENT
Start: 2022-08-17 | End: 2022-08-17 | Stop reason: HOSPADM

## 2022-08-17 RX ORDER — ROPIVACAINE HYDROCHLORIDE 5 MG/ML
INJECTION, SOLUTION EPIDURAL; INFILTRATION; PERINEURAL
Status: COMPLETED
Start: 2022-08-17 | End: 2022-08-17

## 2022-08-17 RX ADMIN — SODIUM CHLORIDE, POTASSIUM CHLORIDE, SODIUM LACTATE AND CALCIUM CHLORIDE 25 ML/HR: 600; 310; 30; 20 INJECTION, SOLUTION INTRAVENOUS at 10:59

## 2022-08-17 RX ADMIN — Medication 120 MCG: at 12:24

## 2022-08-17 RX ADMIN — ROCURONIUM BROMIDE 20 MG: 10 INJECTION INTRAVENOUS at 12:33

## 2022-08-17 RX ADMIN — WATER 2 G: 1 INJECTION INTRAMUSCULAR; INTRAVENOUS; SUBCUTANEOUS at 12:15

## 2022-08-17 RX ADMIN — Medication 120 MCG: at 12:26

## 2022-08-17 RX ADMIN — DEXAMETHASONE SODIUM PHOSPHATE 8 MG: 4 INJECTION, SOLUTION INTRAMUSCULAR; INTRAVENOUS at 12:25

## 2022-08-17 RX ADMIN — GLYCOPYRROLATE 0.3 MG: 0.2 INJECTION, SOLUTION INTRAMUSCULAR; INTRAVENOUS at 13:05

## 2022-08-17 RX ADMIN — Medication 120 MCG: at 12:38

## 2022-08-17 RX ADMIN — Medication 120 MCG: at 12:33

## 2022-08-17 RX ADMIN — ROCURONIUM BROMIDE 10 MG: 10 INJECTION INTRAVENOUS at 12:15

## 2022-08-17 RX ADMIN — SUCCINYLCHOLINE CHLORIDE 160 MG: 20 INJECTION, SOLUTION INTRAMUSCULAR; INTRAVENOUS at 12:15

## 2022-08-17 RX ADMIN — DEXMEDETOMIDINE HYDROCHLORIDE 5 MCG: 100 INJECTION, SOLUTION, CONCENTRATE INTRAVENOUS at 12:16

## 2022-08-17 RX ADMIN — Medication 3 MG: at 13:05

## 2022-08-17 RX ADMIN — Medication 100 MCG: at 12:30

## 2022-08-17 RX ADMIN — DEXMEDETOMIDINE HYDROCHLORIDE 5 MCG: 100 INJECTION, SOLUTION, CONCENTRATE INTRAVENOUS at 12:11

## 2022-08-17 RX ADMIN — ONDANSETRON HYDROCHLORIDE 4 MG: 2 INJECTION, SOLUTION INTRAMUSCULAR; INTRAVENOUS at 13:05

## 2022-08-17 RX ADMIN — Medication 120 MCG: at 12:22

## 2022-08-17 RX ADMIN — ROPIVACAINE HYDROCHLORIDE 30 ML: 5 INJECTION, SOLUTION EPIDURAL; INFILTRATION; PERINEURAL at 11:38

## 2022-08-17 RX ADMIN — Medication 100 MCG: at 12:43

## 2022-08-17 RX ADMIN — PROPOFOL 170 MG: 10 INJECTION, EMULSION INTRAVENOUS at 12:15

## 2022-08-17 RX ADMIN — SODIUM CHLORIDE 60 MCG/MIN: 900 INJECTION, SOLUTION INTRAVENOUS at 12:46

## 2022-08-17 RX ADMIN — FENTANYL CITRATE 50 MCG: 50 INJECTION, SOLUTION INTRAMUSCULAR; INTRAVENOUS at 12:15

## 2022-08-17 RX ADMIN — FENTANYL CITRATE 50 MCG: 50 INJECTION, SOLUTION INTRAMUSCULAR; INTRAVENOUS at 12:09

## 2022-08-17 RX ADMIN — MIDAZOLAM HYDROCHLORIDE 3 MG: 1 INJECTION, SOLUTION INTRAMUSCULAR; INTRAVENOUS at 11:35

## 2022-08-17 NOTE — H&P
PRE-OP HISTORY AND PHYSICAL    Subjective:     Patient is a 73 y.o.  male presented with a history of left shoulder pain. Onset of symptoms was gradual with gradually worsening course since that time. He is being admitted for surgical management of this condition. The indications for the procedure include failed nonop tx. There are no problems to display for this patient. Past Medical History:   Diagnosis Date    At risk for sleep apnea 08/01/2022    during PAT phone assessment, stopbang score 6    Diabetes ([address]     Hypertension       Past Surgical History:   Procedure Laterality Date    HX CATARACT REMOVAL Bilateral     HX HEENT      retinal tear procedure      Prior to Admission medications    Medication Sig Start Date End Date Taking? Authorizing Provider   losartan (COZAAR) 100 mg tablet Take 100 mg by mouth in the morning. Yes Provider, Historical   cyanocobalamin (VITAMIN B12) 100 mcg tablet Take 100 mcg by mouth in the morning. Yes Provider, Historical   multivitamin (ONE A DAY) tablet Take 1 Tablet by mouth in the morning. Yes Provider, Historical   pravastatin (PRAVACHOL) 20 mg tablet Take 20 mg by mouth nightly. Yes Provider, Historical   fenofibrate (LOFIBRA) 160 mg tablet Take 160 mg by mouth in the morning. Yes Provider, Historical   dapagliflozin (FARXIGA) 5 mg tab tablet Take 5 mg by mouth in the morning. Yes Provider, Historical   hydroCHLOROthiazide (HYDRODIURIL) 12.5 mg tablet Take 12.5 mg by mouth in the morning. Yes Provider, Historical   glimepiride (AMARYL) 4 mg tablet Take 6 mg by mouth every morning. Yes Provider, Historical   SITagliptin-metFORMIN (Janumet) 50-1,000 mg per tablet Take 2 Tablets by mouth Daily (before dinner). Yes Provider, Historical   aspirin 81 mg chewable tablet Take 81 mg by mouth daily.    Yes Provider, Historical   meloxicam (MOBIC) 15 mg tablet TAKE 1 TABLET BY MOUTH DAILY  Patient not taking: Reported on 8/17/2022 7/11/22   Nicko Martinez Melvin JOSEPH, DO     No Known Allergies   Social History     Tobacco Use    Smoking status: Never     Passive exposure: Never    Smokeless tobacco: Never   Substance Use Topics    Alcohol use: Not Currently     Comment: maybe once a month a glass of wine      History reviewed. No pertinent family history. Review of Systems  Pertinent items are noted in HPI. Objective:     Patient Vitals for the past 8 hrs:   BP Temp Pulse Resp SpO2 Height Weight   08/17/22 1150 118/88 -- (!) 106 21 100 % -- --   08/17/22 1145 105/85 -- (!) 105 17 100 % -- --   08/17/22 1140 114/85 -- (!) 103 20 100 % -- --   08/17/22 1045 (!) 132/91 98 °F (36.7 °C) (!) 101 12 99 % 5' 7\" (1.702 m) 163 lb (73.9 kg)     Physical Exam:    General:  Alert and oriented. No acute distress. HEENT:  Normocephalic, atraumatic    Chest:  Respirations unlabored    Abdomen:  Soft, non-tender    Extremities:  No evidence of cyanosis. Pulses palpable in both upper and lower extremities. Portia's sign negative in bilateral lower extremities. Moving all extremities. Left shoulder pain with ROM. +imping    Neurologic:  No motor deficits. Sensation stable. Neurovascular exam within normal limits. Imaging Review  MRI Results (most recent):  Results from Hospital Encounter encounter on 04/05/22    MRI SHOULDER LT WO CONT    Narrative  EXAM: MRI SHOULDER LT WO CONT    INDICATION: Dx: Nontraumatic complete tear of left rotator cuff [Y61.178  (ICD-10-CM)]; Impingement syndrome of left shoulder [M75.42 (ICD-10-CM)]; Arthritis of left acromioclavicular joint [M19.012 (ICD-10-CM)]    COMPARISON: Left shoulder radiographs 3/30/2022    TECHNIQUE: Axial proton density fat-saturated; oblique coronal T1, T2  fat-saturated, and proton density fat-saturated; and oblique sagittal T2  fat-saturated MRI of the left shoulder . CONTRAST: None. FINDINGS: Study limited by motion. A.C. joint: Mild osteoarthritis.  Anterior acromion process type: 1    Bone marrow: Subcortical cystic changes in the posterior humeral head. No acute  fracture, dislocation, or marrow replacing process. Joint fluid: No significant joint effusion. Small subacromial/subdeltoid bursal  effusion/bursitis. .    Rotator cuff tendons: Multifocal high-grade undersurface tears throughout the  supraspinatus critical zone. Infraspinatus and subscapularis tendons are  intact. .    Biceps tendon: Tendinosis and attritional tear of the intracapsular portion. No  subluxation. .    Muscles: No edema or atrophy. Glenoid labrum: SLAP tear extending into the biceps anchor. Glenohumeral joint capsule: Diffuse thickening, patchy increased signal,  ill-definition, and pericapsular edema signal, which can be seen with adhesive  capsulitis. Glenohumeral articular cartilage: Grossly intact. No focal osteochondral lesion. Soft tissue mass: None. Impression  1. Study limited by motion. 2.  Multifocal high-grade undersurface tears throughout the supraspinatus  critical zone. Small subacromial/subdeltoid bursal effusion/bursitis. 3. SLAP tear extending into the biceps anchor. Intracapsular long head biceps  tendinosis and attritional tear. 4.  Findings suggestive of adhesive capsulitis. 5.  Mild acromioclavicular osteoarthritis. Assessment:     Active Problems:    * No active hospital problems. *    L shoulder pain  Plan:     The various methods of treatment have been discussed with the patient and family. After consideration of risks, benefits and other options for treatment, the patient has consented to surgical interventions (left shoulder arthroscopy, rtc repair, acromioplasty, rachel, capsular release, dce). The risks of surgery were explained. Risks of infection, blood loss, neurovascular injury, anesthesia risks, and risks secondary to patient comorbidities were explained.          Yung Peace DO

## 2022-08-17 NOTE — ANESTHESIA POSTPROCEDURE EVALUATION
Procedure(s):  LEFT SHOULDER ARTHROSCOPY WITH  CAPSULAR RELEASE, ACROMIOPLASTY, AND MANIPULATION.     general, regional    Anesthesia Post Evaluation      Multimodal analgesia: multimodal analgesia used between 6 hours prior to anesthesia start to PACU discharge  Patient location during evaluation: PACU  Patient participation: complete - patient participated  Level of consciousness: awake and alert  Pain score: 0  Airway patency: patent  Anesthetic complications: no  Cardiovascular status: acceptable  Respiratory status: acceptable  Hydration status: acceptable  Comments: Pt has interscalene block/ sling postop  Post anesthesia nausea and vomiting:  none  Final Post Anesthesia Temperature Assessment:  Normothermia (36.0-37.5 degrees C)      INITIAL Post-op Vital signs:   Vitals Value Taken Time   /79 08/17/22 1345   Temp 36.3 °C (97.4 °F) 08/17/22 1322   Pulse 94 08/17/22 1345   Resp 17 08/17/22 1345   SpO2 97 % 08/17/22 1345

## 2022-08-17 NOTE — PERIOP NOTES
1423  Pt awake and alert. Denies any pain or discomfort. Assist to dress. 1431 sitting on side of bed, c/o feeling tired, assist to wheelchair, face pale. 1435  /70  heart rate 94 ting in wheelchair. No longer pale, states he just feels tired    1438 BP standing 122/87 heart rate 94 standing. States he feels ok just tired. 1451 States he has to go to bathroom, assist to bathroom to void and have large BM. States he feels better    1504 Pt discharged via wheelchair, accompanied by RN. Pt discharged awake and alert, respirations equal and unlabored, skin warm, dry, and intact. Pt and family members' questions and concerns addressed prior to discharge.

## 2022-08-17 NOTE — DISCHARGE INSTRUCTIONS
Shoulder Surgery:  Postoperative Instructions  Dr. Melvi Calzada may resume your regular diet as soon as possible    Medication   Take the pain medication as prescribed  Take pain medication with food  While taking pain medications, you may NOT operate a vehicle, heavy machinery, or appliances  While taking pain medication, you may NOT drink alcoholic beverages  If you have any reactions to your medications, stop taking them and call my office  Please keep in mind that constipation is a very common side effect of taking narcotic pain medication. Take precautions to prevent constipation:  Drink plenty of water (6-8 glasses of 8 oz. a day)  Avoid alcohol, caffeine, and dairy products  Eat plenty of fiber (fruits, vegetables, and whole grains)  Take an over the counter stool softener (Colace or Dulcolax)  Patients that have had upper extremity surgery should take frequent walks    Activity   Minimize activity the day of surgery   DO NOT USE HEAT   Please keep ice applied to the shoulder for the first 72 hours or as long as pain or swelling persists. Do not apply ice directly to skin, or allow water to leak on your dressing. START WORKING ON RANGE OF MOTION WHEN TOLERABLE  SLING IS FOR COMFORT ONLY. NO RESTRICTIONS TO USING THE SHOULDER  START PHYSICAL THERAPY WITHIN A WEEK  Open and close your hand 10 times every day for about 1 minute. You may also flex and extend your elbow each day when your sling is removed for showering. Be sure to keep your elbow at your side. Sling/ Immobilizer   SLING IS FOR COMFORT ONLY    Showering   You may shower 3 days after surgery unless told otherwise. DO NOT immerse the shoulder under water and DO NOT rub the incision. Place new Band-Aids over the sutures after showering. You may sponge bathe for the first 72 hours, taking care to keep the dressing clean and dry. Dressing Care   It is normal to get some bloody wound seepage through the bandage.  DO NOT BE ALARMED. If the dressing gets soaked with wound seepage, place more gauze over the dressing and secure with tape. If this soaks through remove the entire dressing and replace with STERILE gauze and tape   Remove all dressings at 72 hours after surgery. If there is still some wound seepage, apply a fresh STERILE gauze over the incisions and secure with tape. DO NOT TOUCH OR REMOVE THE SUTURES!! Emergency/Follow-Up  Please notify my office at [phone] if you develop any fever (101 deg or above), unexpected warmth, redness or swelling. Please call if your fingers become cold, purple, numb, or there is excessive bleeding. Please call the office within 24 hours at [phone] to schedule a follow up appt within 7-10 days from surgery. No pain medications refills can be provided after 3pm on Fridays or over the weekend. I have read and understand the above discharge instructions. ______________________________                      ________________  Patient or Responsible Party                                              Date    ______________________________                                           RN     Lino Bene may take tylenol 1,000mg every 6 hours for mild pain or in addition to oxycodone for severe pain. May take ibuprofen,advil or motrin 400 - 600 mg every 6 hours for mild to moderate pain or in addition to tylenol and oxycodone for severe pain. TAKE NARCOTIC PAIN MEDICATIONS WITH FOOD! For the night of surgery, while block is still in effect, start with 1 pain pill at bedtime    Narcotics tend to be constipating and we recommend taking a stool softener such as Colace or Miralax (follow package instructions). If you were given prescriptions, please review the written information on the prescribed medications. DO NOT DRIVE WHILE TAKING NARCOTIC PAIN MEDICATIONS. CPAP PATIENTS BE SURE TO WEAR MACHINE WHENEVER NAPPING OR SLEEPING DAY/NIGHT OF SURGERY!     DISCHARGE SUMMARY from Nurse    The following personal items collected during your admission are returned to you:   Dental Appliance: Dental Appliances: None  Vision: Visual Aid: Glasses (Glasses given to wife)  Hearing Aid:    Jewelry: Jewelry: None  Clothing: Clothing: With patient  Other Valuables: Other Valuables: Eyeglasses (Glasses given to wife)  Valuables sent to safe:        PATIENT INSTRUCTIONS:    After General Anesthesia or Intravenous Sedation, for 24 hours or while taking prescription Narcotics:  Someone should be with you for the next 24 hours. For your own safety, a responsible adult must drive you home. Limit your activities  Recommended activity: Rest today, up with assistance today. Do not climb stairs or shower unattended for the next 24 hours. Start with a soft bland diet and advance as tolerated (no nausea) to regular diet. If you have a sore throat some things that may help are: fluids, warm salt water gargle, or throat lozenges. If this does not improve after several days please follow up with your family physician. Do not drive and operate hazardous machinery  Do not make important personal or business decisions  Do  not drink alcoholic beverages  If you have not urinated within 8 hours after discharge, please contact your surgeon on call. Report the following to your surgeon:  Excessive pain, swelling, redness or odor of or around the surgical area  Temperature over 100.5  Nausea and vomiting lasting longer than 4 hours or if unable to take medications  Any signs of decreased circulation or nerve impairment to extremity: change in color, persistent  numbness, tingling, coldness or increase pain    If you received an upper extremity nerve block, please wear your sling until the block has worn off, then refer to your surgeons post-operative instructions. If you have had a shoulder block or a block near your collar bone, you may have symptoms such as:          1.  Mild shortness of breath 2. A hoarse voice        3. Blurry vision        4. Unequal pupils        5. Drooping of your face on the same side as the nerve block. These symptoms will disappear as the nerve block wears off. You will receive a Post Operative Call from one of the Recovery Room Nurses on the day after your surgery to check on you. It is very important for us to know how you are recovering after your surgery. If you have an issue please call your surgeon, do not wait for the post operative call. You may receive an e-mail or letter in the mail from [employer] regarding your experience with us in the Ambulatory Surgery Unit. Your feedback is valuable to us and we appreciate your participation in the survey. If the above instructions are not adequate or you are having problems after your surgery, call your physician at their office number. We wish youre a speedy recovery ? TO PREVENT AN INFECTION      [address] YOUR HANDS    To prevent infection, good handwashing is the most important thing you or your caregiver can do. Wash your hands with soap and water or use the hand  we gave you before you touch any wounds. SHOWER    Use the antibacterial soap we gave you when you take a shower. Shower with this soap until your wounds are healed. USE CLEAN SHEETS    Use freshly cleaned sheets on your bed after surgery. To keep the surgery site clean, do not allow pets to sleep with you while your wound is still healing. CONTROL YOUR BLOOD SUGAR    High blood sugars slow wound healing. If you are diabetic, control your blood sugar levels before and after your surgery. What to do at Home:    *  Please give a list of your current medications to your Primary Care Provider. *  Please update this list whenever your medications are discontinued, doses are      changed, or new medications (including over-the-counter products) are added.     *  Please carry medication information at all times in case of emergency situations. If you have not had your influenza or pneumococcal vaccines, please follow up with your primary care physician. The discharge information has been reviewed with the patient and caregiver. The patient and caregiver verbalized understanding.

## 2022-08-17 NOTE — ANESTHESIA PROCEDURE NOTES
Peripheral Block    Start time: 8/17/2022 11:32 AM  End time: 8/17/2022 11:40 AM  Performed by: Bev Martins MD  Authorized by: Bev Martins MD       Pre-procedure: Indications: at surgeon's request and post-op pain management    Preanesthetic Checklist: patient identified, risks and benefits discussed, site marked, timeout performed, anesthesia consent given, patient being monitored and fire risk safety assessment completed and verbalized    Timeout Time: 11:34 EDT      Block Type:   Block Type:   Interscalene  Laterality:  Left  Monitoring:  Standard ASA monitoring, responsive to questions and oxygen  Injection Technique:  Single shot  Procedures: ultrasound guided    Patient Position: supine  Prep: chlorhexidine    Location:  Interscalene  Needle Type:  Stimuplex  Needle Gauge:  22 G  Needle Localization:  Ultrasound guidance  Medication Injected:  Midazolam (VERSED) injection - IntraVENous   3 mg - 8/17/2022 11:35:00 AM  ropivacaine (PF) (NAROPIN)(0.5%) 5 mg/mL injection - Peripheral Nerve Block, Left Arm   30 mL - 8/17/2022 11:38:00 AM  Med Admin Time: 8/17/2022 11:38 AM    Assessment:  Number of attempts:  1  Injection Assessment:  Incremental injection every 5 mL, no paresthesia, ultrasound image on chart, local visualized surrounding nerve on ultrasound, negative aspiration for blood and no intravascular symptoms  Patient tolerance:  Patient tolerated the procedure well with no immediate complications

## 2022-08-17 NOTE — PERIOP NOTES
Dr. De Robles performed a LUE block with ultrasound. Patient on continuous cardiac and pulse oximetry monitoring throughout the procedure, nasal cannula 3 liters. Patient received 3 mg, Versed, IV, administered by Dr. De Robles. Vital signs stable. Patient tolerated procedure well. Patient drowsy but arouses when spoken to. Will continue to monitor.

## 2022-08-17 NOTE — PERIOP NOTES
Permission received to review discharge instructions and discuss private health information with wifeKari. Patient states that wifeKari will be with them for at least 24 hours following today's procedure. Mistral-Air warming blanket applied at this time. Set to appropriate setting that is comfortable to patient. Will continue to monitor.

## 2022-08-17 NOTE — ANESTHESIA PREPROCEDURE EVALUATION
Relevant Problems   No relevant active problems       Anesthetic History   No history of anesthetic complications            Review of Systems / Medical History  Patient summary reviewed, nursing notes reviewed and pertinent labs reviewed    Pulmonary          Undiagnosed apnea      Comments:  STOP BANG 6   Neuro/Psych   Within defined limits           Cardiovascular    Hypertension: well controlled              Exercise tolerance: >4 METS     GI/Hepatic/Renal  Within defined limits              Endo/Other    Diabetes: type 2         Other Findings   Comments: Left shoulder rotator cuff tear, impingement         Physical Exam    Airway  Mallampati: I  TM Distance: > 6 cm  Neck ROM: normal range of motion   Mouth opening: Normal     Cardiovascular    Rhythm: regular  Rate: normal         Dental  No notable dental hx       Pulmonary  Breath sounds clear to auscultation               Abdominal  GI exam deferred       Other Findings            Anesthetic Plan    ASA: 2  Anesthesia type: general and regional - interscalene block      Post-op pain plan if not by surgeon: peripheral nerve block single    Induction: Intravenous  Anesthetic plan and risks discussed with: Patient      preop glucose 217 (runs in the 200's per pt)

## 2022-08-17 NOTE — BRIEF OP NOTE
Brief Postoperative Note    Patient: Guicho Gunderson  YOB: 1955  MRN: [id number]    Date of Procedure: 8/17/2022     Pre-Op Diagnosis: LEFT SHOULDER ROTATOR CUFF TEAR, LEFT SHOULDER IMPENGEMENT, BICEPS TENOPATHY, ACROMIOCLAVICLE JOINT OA    Post-Op Diagnosis:  LEFT SHOULDER IMPINGMENT, AC ARTHRITIS, ADHESIVE CAPSULITIS    Procedure(s):  LEFT SHOULDER ARTHROSCOPY WITH  CAPSULAR RELEASE, ACROMIOPLASTY, AND MANIPULATION UNDER ANESTHESIA    Surgeon(s):  Verdia Snellen, DO    Surgical Assistant: Surg Asst-1: Katie Schneider    Anesthesia: General     Estimated Blood Loss (mL): Minimal    Complications: None    Specimens: * No specimens in log *     Implants: * No implants in log *    Drains: * No LDAs found *    Findings: Left shoulder impingment, ac arthritis, significant adhesive capsulitis    Electronically Signed by Leyla Gannon DO on 8/17/2022 at 1:18 PM

## 2022-08-17 NOTE — PERIOP NOTES
[facility]  1955  [id number]    Situation:  Verbal report given from: NIKKY Negron CRNA and Leonides Doan  Procedure: Procedure(s):  LEFT SHOULDER ARTHROSCOPY WITH  CAPSULAR RELEASE, ACROMIOPLASTY, AND MANIPULATION    Background:    Preoperative diagnosis: LEFT SHOULDER ROTATOR CUFF TEAR, LEFT SHOULDER IMPENGEMENT, BICEPS TENOPATHY, ACROMIOCLAVICLE JOINT OA    Postoperative diagnosis: LEFT SHOULDER ROTATOR CUFF TEAR, LEFT SHOULDER Yesica Lindsay, ACROMIOCLAVICLE JOINT OA    :  Dr. Edward Crook    Assistant(s): Circ-1: Zakia Watson RN  Scrub Tech-1: Raoul Hamman L  Surg Asst-1: Jhon Ga    Specimens: * No specimens in log *    Assessment:  Intra-procedure medications         Anesthesia gave intra-procedure sedation and medications, see anesthesia flow sheet     Intravenous fluids: LR@ KVO     Vital signs stable       Recommendation:    Permission to share finding with wife  :

## 2022-08-18 NOTE — OP NOTES
[address]  OPERATIVE REPORT    Name:  Max Spicer  MR#:  [id number]  :  1955  ACCOUNT #:  [de-identified]  DATE OF SERVICE:  2022    PREOPERATIVE DIAGNOSES:  1. Left shoulder adhesive capsulitis. 2.  Left shoulder partial rotator cuff tear. 3.  Left shoulder impingement. 4.  Left shoulder acromioclavicular joint arthritis. POSTOPERATIVE DIAGNOSES:  1. Left shoulder adhesive capsulitis. 2.  Left shoulder partial rotator cuff tear. 3.  Left shoulder impingement. 4.  Left shoulder acromioclavicular joint arthritis. PROCEDURE PERFORMED:  1. Left shoulder manipulation under anesthesia. 2.  Left shoulder arthroscopic acromioplasty. 3.  Left shoulder arthroscopic capsular release. 4.  Left shoulder arthroscopic distal clavicle resection. SURGEON:  Andria Evans DO    ASSISTANT:  [facility] staff    ANESTHESIA:  General with nerve block. COMPLICATIONS:  None. SPECIMENS REMOVED:  None. IMPLANTS:  none. ESTIMATED BLOOD LOSS:  Minimal.    DISPOSITION:  Stable to PACU. INDICATIONS FOR THE PROCEDURE:  The patient is a 43-year-old diabetic male who has been following in the office with the left shoulder pain and significant stiffness. After failing conservative treatment options, we elected to obtain an MRI, which showed evidence of adhesive capsulitis, which correlated with his symptoms. He also had signs of partial rotator cuff tear, impingement, and AC joint arthritis pain. We discussed treatment options. Risks and benefits of left shoulder arthroscopy were explained. Risks of infection, blood loss, neurovascular injury, anesthesia risks, and risks secondary to patient's comorbidities were described. Once he was medically optimized and ready for surgical treatment, he was seen in the preoperative holding area. History and physical forms and consent forms were confirmed in his chart. His operative extremity was marked by Orthopedics.     OPERATION:  After being given a nerve block by Anesthesia, he was taken to the OR. He was transferred to the operating room table. He was placed in the lateral decubitus position with the left shoulder facing upwards and all prominences were carefully padded including axillary roll. We prepped and draped in the normal sterile fashion after he was given sedation and LMA was placed by Anesthesia. After sterile prepping and draping, a time-out was called. The patient was identified by name, date of birth, operative site, and operative procedure. After all were in agreement that the left shoulder was the operative extremity, I started by checking the overall range of motion, which was severely limited. Passive range of motion was approximately 80 degrees of abduction and forward flexion. Internal rotation and external rotation were severally limited as well. I started with a careful manipulation under anesthesia and we had a satisfying release of scar tissues. We then started our arthroscopy, placing a posterior portal and arthroscope through this posterior portal.  Diagnostic arthroscopy of glenohumeral joint showed intact chondral structures. Evidence of anterior capsular thickness was noted and a thickened middle glenohumeral ligament. Rotator cuff overall was noted to be intact. There was significant adhesion noted in the rotator interval.  Proximal biceps tendon did have some evidence of tendinopathy, but superior labrum was intact. We debrided small area of the superior labrum after making the anterior portal through an outside-in technique. We visualized the rotator cuff, which was noted to be intact. We took arthroscopic pictures.   We used the combination of arthroscopic cautery, arthroscopic shaver, and arthroscopic punch device to perform a capsular release, release of the middle glenohumeral ligament, and release of the rotator interval.  We then moved the arthroscope to the subacromial space and identified some fraying of the dorsal surface of the rotator cuff, but no significant full-thickness tearing. We performed an acromioplasty using a cutting block technique for an acromial spur. After the poornima was used for the acromioplasty, we turned our attention to the distal clavicle resection. We resected approximately 1 cm of distal clavicle while preserving the posterior-superior capsular ligamentous structures. We took final pictures and checked overall range of motion, which was significantly improved to near normal values. We closed with 3-0 nylon sutures and placed sterile dressings. He was transferred to the PACU. He will be discharged home with instructions. I would like him to start using his shoulder and working on range of motion as soon as possible. We will start him with physical therapy as soon as we can as well.         Kings Mcghee DO [id number]  D:  08/17/2022 13:42  T:  08/17/2022 21:30  JOB #:  [id number]

## 2022-08-23 ENCOUNTER — OFFICE VISIT (OUTPATIENT)
Dept: ORTHOPEDIC SURGERY | Age: 67
End: 2022-08-23
Payer: MEDICARE

## 2022-08-23 DIAGNOSIS — M75.02 ADHESIVE CAPSULITIS OF LEFT SHOULDER: ICD-10-CM

## 2022-08-23 DIAGNOSIS — M25.612 STIFFNESS OF LEFT SHOULDER JOINT: Primary | ICD-10-CM

## 2022-08-23 DIAGNOSIS — M75.112 PARTIAL NONTRAUMATIC TEAR OF ROTATOR CUFF, LEFT: ICD-10-CM

## 2022-08-23 PROCEDURE — 97162 PT EVAL MOD COMPLEX 30 MIN: CPT | Performed by: PHYSICAL THERAPIST

## 2022-08-23 PROCEDURE — 97110 THERAPEUTIC EXERCISES: CPT | Performed by: PHYSICAL THERAPIST

## 2022-08-23 PROCEDURE — 97140 MANUAL THERAPY 1/> REGIONS: CPT | Performed by: PHYSICAL THERAPIST

## 2022-08-23 NOTE — PROGRESS NOTES
Patient Name: Valencia Carreon  Date:2022  : 1955  [x]  Patient  Verified  Payor: Lucy Saldaña / Plan: [place] / Product Type: Medicare /    Total Treatment Time (min): 45  1:1 Treatment Time ( [address] only): 45   Melvin Morrison, DO  1. Stiffness of left shoulder joint  2. Adhesive capsulitis of left shoulder  3. Partial nontraumatic tear of rotator cuff, left    Subjective:    Patient is a 73 y.o. male referred to physical therapy by Dr. Billy Hicks with a diagnosis of left shoulder manipulation under anesthesia performed as result of adhesive capsulitis on 2022. At the same time of surgery he also underwent a distal clavicle excision, acromioplasty, and capsular release as well. He reports at least a 1 year history of shoulder pain with several aggravating incidents since that time but without 1 specific mechanism for injury. He did attend preoperative physical therapy and chiropractic care without consistent relief. Preoperative MRI did show a partial supraspinatus tear but no rotator cuff repair was performed at the time of surgery. He is right-handed. He is retired. He would like to be more functional with ADL lifting tasks around his home and yard. He states that postoperative pain is anywhere from a 2 to an 8/10. He has yet to be instructed in any post-manipulation exercise. He reports a medical history which is significant for diabetes, hypertension, and high cholesterol. Past medical history and medication list can otherwise be reviewed per the EHR. Objective:    Patient presents []  with sling                             [x]  without sling  []  Independent with sling management  []  Needs assistance with sling management    Incisions appear intact and healing without current signs of infection. Left Shoulder ROM assessed in supine:  Elevation: Active 30 degrees, passive 90 degrees with significant restriction  ER:  Active to his ear with compensation, passive 20 degrees with significant restriction  IR: Active to the iliac crest, passive 30 degrees with significant restriction    Strength and active ROM not assessed on surgical side secondary to surgical restriction. Right Shoulder AROM:  Elevation: 155 degrees  ER: upper Thoracic spine  IR: Thoracic lumbar junction    SPADI: 45%          Man: 15 min  GH mobilization to the posterior/inferior capsule in combination with passive range of motion into all directions as tolerated. Ex: 10 min  Patient was instructed in a home exercise program and provided with written/visual handouts. All questions were addressed. Assessment:  Patient demonstrates increased pain and decreased ROM, strength and mobility consistent with left shoulder stiffness post manipulation, capsular release, distal clavicle excision, and acromioplasty performed August 17, 2022. Patient is a diabetic. Long Term Goals: 12 weeks. Patient will demonstrate ROM to within at least 95% or greater as compared to the contralateral side to assist with home/community/recreational ADL activity. Patient will report pain to be consistently less than or equal to 1/10 with all home/community/recreational ADL activity. Short Term Goals. 2 visits  Patient will report and demonstrate independence with a HEP. Plan:  Plan of care: Physical therapy consist of a frequency of 2-3/week for the next 12weeks. Physical therapy will consist of therapeutic exercise, modalities, patient education, neuromuscular reeducation, manual therapy, therapeutic activity, dry needling, and instruction in home exercise program as appropriate. Eval  Ex: 10  Man: 15  NMR:      The referring physician has reviewed and approved this evaluation and plan of care as noted by the electronic signature attached to note.     Britney Dose DPT, ATC

## 2022-08-26 ENCOUNTER — OFFICE VISIT (OUTPATIENT)
Dept: ORTHOPEDIC SURGERY | Age: 67
End: 2022-08-26
Payer: MEDICARE

## 2022-08-26 DIAGNOSIS — M25.512 LEFT SHOULDER PAIN, UNSPECIFIED CHRONICITY: ICD-10-CM

## 2022-08-26 DIAGNOSIS — M75.112 PARTIAL NONTRAUMATIC TEAR OF ROTATOR CUFF, LEFT: ICD-10-CM

## 2022-08-26 DIAGNOSIS — M75.02 ADHESIVE CAPSULITIS OF LEFT SHOULDER: ICD-10-CM

## 2022-08-26 DIAGNOSIS — M25.612 STIFFNESS OF LEFT SHOULDER JOINT: Primary | ICD-10-CM

## 2022-08-26 PROCEDURE — 97110 THERAPEUTIC EXERCISES: CPT | Performed by: PHYSICAL THERAPIST

## 2022-08-26 PROCEDURE — 97140 MANUAL THERAPY 1/> REGIONS: CPT | Performed by: PHYSICAL THERAPIST

## 2022-08-26 NOTE — PROGRESS NOTES
Patient Name: Edgar Figueroa  Date:2022  : 1955  [x]  Patient  Verified  Payor: Bushra Edmonds / Plan: [place] MED[place] / Product Type: Medicare /    Total Treatment Time (min): 50  1:1 Treatment Time ( [address] only): 40  Referring provider: Francisco Dailey DO  1. Stiffness of left shoulder joint  2. Adhesive capsulitis of left shoulder  3. Partial nontraumatic tear of rotator cuff, left  4. Left shoulder pain, unspecified chronicity      SUBJECTIVE  States he has noticed started noticing some improved mobility with decreased pain since starting his home exercise program.    OBJECTIVE  Modality:   []  E-Stim: type _ x _ min     []att   []unatt   []w/US   []w/ice   []w/heat  []  Ultrasound: []cont   []pulse    _ W/cm2 x _  min   []1MHz   []3MHz  [x]  Ice pack _  Post       [x] Hot pack _  Pre       []  Other:    Man: 30 min    GH mobilization to the posterior/inferior capsule in combination with PROM into all directions as tolerated while supine/sidelying. contract relax techniques utilized with mobilization. NMR:  min  Neuromuscular reeducation/proprioceptive training listed in exercise below. Ex: 15 min  Therapeutic exercise/strength/endurance completed here in clinic today per the exercise log. osteokinematic and arthrokinematic exercises for the shoulder in supine and sitting. AA elevation to 80 degrees. Passive elevation 100 degrees, ER 30 degrees. PT Exercise Log         EXERCISE 2022   Standing pulley y                                                                                    Ex: 15 min  Man: 30 min  NMR:  min    ASSESSMENT  [x]  See Plan of Care  [x]  Patient will continue to benefit from skilled therapy to address remaining functional deficits:   Early improvements in elevation as compared to initial evaluation. Contract relax techniques do seem helpful. Still with obvious pain and restrictions in all directions, however.     PLAN  Continue with current plan of care and progress as appropriate towards functional goals.   [x]  Upgrade activities as tolerated     [x]  Continue plan of care  []  Discharge due to:_  [] Other:_       Lucio Berman PT, DPT  8/26/2022    12:21 PM

## 2022-09-02 ENCOUNTER — OFFICE VISIT (OUTPATIENT)
Dept: ORTHOPEDIC SURGERY | Age: 67
End: 2022-09-02
Payer: MEDICARE

## 2022-09-02 VITALS — BODY MASS INDEX: 25.58 KG/M2 | HEIGHT: 67 IN | WEIGHT: 163 LBS

## 2022-09-02 DIAGNOSIS — Z98.890 STATUS POST ARTHROSCOPY OF SHOULDER: ICD-10-CM

## 2022-09-02 DIAGNOSIS — M75.02 ADHESIVE CAPSULITIS OF LEFT SHOULDER: Primary | ICD-10-CM

## 2022-09-02 PROCEDURE — 1123F ACP DISCUSS/DSCN MKR DOCD: CPT | Performed by: ORTHOPAEDIC SURGERY

## 2022-09-02 PROCEDURE — G8536 NO DOC ELDER MAL SCRN: HCPCS | Performed by: ORTHOPAEDIC SURGERY

## 2022-09-02 PROCEDURE — G8432 DEP SCR NOT DOC, RNG: HCPCS | Performed by: ORTHOPAEDIC SURGERY

## 2022-09-02 PROCEDURE — G8427 DOCREV CUR MEDS BY ELIG CLIN: HCPCS | Performed by: ORTHOPAEDIC SURGERY

## 2022-09-02 PROCEDURE — 99024 POSTOP FOLLOW-UP VISIT: CPT | Performed by: ORTHOPAEDIC SURGERY

## 2022-09-02 PROCEDURE — 1101F PT FALLS ASSESS-DOCD LE1/YR: CPT | Performed by: ORTHOPAEDIC SURGERY

## 2022-09-02 PROCEDURE — G8417 CALC BMI ABV UP PARAM F/U: HCPCS | Performed by: ORTHOPAEDIC SURGERY

## 2022-09-02 PROCEDURE — 3017F COLORECTAL CA SCREEN DOC REV: CPT | Performed by: ORTHOPAEDIC SURGERY

## 2022-09-02 NOTE — LETTER
9/2/2022    Patient: Zeina Powell   YOB: 1955   Date of Visit: 9/2/2022     Ruben Flowers NP  [phone] [id number]  Via Fax: [phone]    Dear Ruben Flowers NP,      Thank you for referring Mr. Donovan to [facility] for evaluation. My notes for this consultation are attached. If you have questions, please do not hesitate to call me. I look forward to following your patient along with you.       Sincerely,    Mariia Tuttle, DO

## 2022-09-02 NOTE — PROGRESS NOTES
Ramón Christopher (: 1955) is a 73 y.o. male, established patient, here for evaluation of the following chief complaint(s):  Post OP Follow Up (shoulder)       ASSESSMENT/PLAN:  Below is the assessment and plan developed based on review of pertinent history, physical exam, labs, studies, and medications. Findings were discussed with the patient today. I stressed the importance of continue to work with physical therapy and on a daily stretching exercises. I will see him back in 6 weeks we discussed possibility of Medrol Dosepak versus muscle relaxers to decrease inflammation and muscle tension. 1. Adhesive capsulitis of left shoulder  2. Status post arthroscopy of shoulder      Return in about 6 weeks (around 10/14/2022). SUBJECTIVE/OBJECTIVE:  Claudio Mckeon (: 1955) is a 73 y.o. male. He is status post manipulation under anesthesia of the left shoulder and capsular release. He has noted improvement in physical therapy with range of motion. No Known Allergies    Current Outpatient Medications   Medication Sig    ondansetron hcl (Zofran) 4 mg tablet Take 1 Tablet by mouth every eight (8) hours as needed for Nausea or Vomiting.    losartan (COZAAR) 100 mg tablet Take 100 mg by mouth in the morning. cyanocobalamin (VITAMIN B12) 100 mcg tablet Take 100 mcg by mouth in the morning.    multivitamin (ONE A DAY) tablet Take 1 Tablet by mouth in the morning. pravastatin (PRAVACHOL) 20 mg tablet Take 20 mg by mouth nightly. fenofibrate (LOFIBRA) 160 mg tablet Take 160 mg by mouth in the morning. dapagliflozin (FARXIGA) 5 mg tab tablet Take 5 mg by mouth in the morning. hydroCHLOROthiazide (HYDRODIURIL) 12.5 mg tablet Take 12.5 mg by mouth in the morning. meloxicam (MOBIC) 15 mg tablet TAKE 1 TABLET BY MOUTH DAILY    glimepiride (AMARYL) 4 mg tablet Take 6 mg by mouth every morning.     SITagliptin-metFORMIN (Janumet) 50-1,000 mg per tablet Take 2 Tablets by mouth Daily (before dinner). aspirin 81 mg chewable tablet Take 81 mg by mouth daily. No current facility-administered medications for this visit. Social History     Socioeconomic History    Marital status:      Spouse name: Not on file    Number of children: Not on file    Years of education: Not on file    Highest education level: Not on file   Occupational History    Not on file   Tobacco Use    Smoking status: Never     Passive exposure: Never    Smokeless tobacco: Never   Vaping Use    Vaping Use: Never used   Substance and Sexual Activity    Alcohol use: Not Currently     Comment: maybe once a month a glass of wine    Drug use: Never    Sexual activity: Not on file   Other Topics Concern    Not on file   Social History Narrative    Not on file     Social Determinants of Health     Financial Resource Strain: Not on file   Food Insecurity: Not on file   Transportation Needs: Not on file   Physical Activity: Not on file   Stress: Not on file   Social Connections: Not on file   Intimate Partner Violence: Not on file   Housing Stability: Not on file       Past Surgical History:   Procedure Laterality Date    HX CATARACT REMOVAL Bilateral     HX HEENT      retinal tear procedure       History reviewed. No pertinent family history. REVIEW OF SYSTEMS:  ROS    Positive for: Musculoskeletal  Last edited by Marry Webster on 9/2/2022 10:02 AM.        Patient denies any recent fever, chills, nausea, vomiting, chest pain, or shortness of breath. Vitals:  Ht 5' 7\" (1.702 m)   Wt 163 lb (73.9 kg)   BMI 25.53 kg/m²    Body mass index is 25.53 kg/m². PHYSICAL EXAM:  General exam: Patient is awake, alert, and oriented x3. Well-appearing. No acute distress. Ambulates with a normal gait. Left shoulder: Neurovascular and sensory intact. Incisions well-healed with no signs of erythema or drainage. Mild swelling and ecchymosis. Decreased range of motion. Normal stability.         IMAGING:    XR Results (most recent):  Results from Appointment encounter on 03/30/22    XR SHOULDER LT AP/LAT MIN 2 V    Narrative  X-rays of the left shoulder 3 views done today show maintained glenohumeral joint space. No signs of acute fracture. Type II acromion. Mild AC joint space narrowing         No orders of the defined types were placed in this encounter. An electronic signature was used to authenticate this note.   -- [employer], DO

## 2022-09-08 ENCOUNTER — OFFICE VISIT (OUTPATIENT)
Dept: ORTHOPEDIC SURGERY | Age: 67
End: 2022-09-08
Payer: MEDICARE

## 2022-09-08 DIAGNOSIS — Z98.890 STATUS POST ARTHROSCOPY OF SHOULDER: ICD-10-CM

## 2022-09-08 DIAGNOSIS — M25.612 STIFFNESS OF LEFT SHOULDER JOINT: ICD-10-CM

## 2022-09-08 DIAGNOSIS — M75.02 ADHESIVE CAPSULITIS OF LEFT SHOULDER: Primary | ICD-10-CM

## 2022-09-08 PROCEDURE — 97140 MANUAL THERAPY 1/> REGIONS: CPT | Performed by: PHYSICAL THERAPIST

## 2022-09-08 PROCEDURE — 97110 THERAPEUTIC EXERCISES: CPT | Performed by: PHYSICAL THERAPIST

## 2022-09-08 NOTE — PROGRESS NOTES
Patient Name: Karmen De  Date:2022  : 1955  [x]  Patient  Verified  Payor: Tiffanie Burkett / Plan: [place] MEDICARE PART [place] & B / Product Type: Medicare /    Total Treatment Time (min): 60  1:1 Treatment Time ( [address] only): 40  Referring provider: Giselle Hathaway DO  1. Adhesive capsulitis of left shoulder  2. Stiffness of left shoulder joint  3. Status post arthroscopy of shoulder      SUBJECTIVE  Verbalizes compliance with home exercise program.  States his shoulder is still stiff but he feels it is improving. OBJECTIVE  Modality:   []  E-Stim: type _ x _ min     []att   []unatt   []w/US   []w/ice   []w/heat  []  Ultrasound: []cont   []pulse    _ W/cm2 x _  min   []1MHz   []3MHz  []  Ice pack _  Post    declines   [x] Hot pack _  Pre       []  Other:    Man: 30 min    GH mobilization to the posterior/inferior capsule in combination with PROM into all directions as tolerated while supine/sidelying. NMR:  min  Neuromuscular reeducation/proprioceptive training listed in exercise below. Ex: 15 min  Therapeutic exercise/strength/endurance completed here in clinic today per the exercise log. osteokinematic and arthrokinematic exercises for the shoulder in supine and sitting. Active elevation to 110. PT Exercise Log         EXERCISE 2022    pulley Y- seated   Ball roll tg   DAP ecc scap 5#   Active IR/ER peach                                                                        Ex: 15 min  Man: 30 min  NMR:  min    ASSESSMENT  [x]  See Plan of Care  [x]  Patient will continue to benefit from skilled therapy to address remaining functional deficits:   Still with clear capsular restrictions in all directions but patient does show some objective improvements in range of motion since initial evaluation. PLAN  Continue with current plan of care and progress as appropriate towards functional goals.   [x]  Upgrade activities as tolerated     [x]  Continue plan of care  []  Discharge due to:_  [] Other:_       Jennifer Keith, PT, DPT  9/8/2022    12:21 PM

## 2022-09-13 ENCOUNTER — OFFICE VISIT (OUTPATIENT)
Dept: ORTHOPEDIC SURGERY | Age: 67
End: 2022-09-13
Payer: MEDICARE

## 2022-09-13 DIAGNOSIS — M75.02 ADHESIVE CAPSULITIS OF LEFT SHOULDER: ICD-10-CM

## 2022-09-13 DIAGNOSIS — Z98.890 STATUS POST ARTHROSCOPY OF SHOULDER: ICD-10-CM

## 2022-09-13 DIAGNOSIS — M25.612 STIFFNESS OF LEFT SHOULDER JOINT: Primary | ICD-10-CM

## 2022-09-13 PROCEDURE — 97110 THERAPEUTIC EXERCISES: CPT | Performed by: PHYSICAL THERAPIST

## 2022-09-13 PROCEDURE — 97140 MANUAL THERAPY 1/> REGIONS: CPT | Performed by: PHYSICAL THERAPIST

## 2022-09-13 NOTE — PROGRESS NOTES
Patient Name: Linnea Redd  Date:2022  : 1955  [x]  Patient  Verified  Payor: Collette Moran / Plan: [place] MEDICARE PART A & B / Product Type: Medicare /    Total Treatment Time (min): 60  1:1 Treatment Time ([facility] only): 45  Referring provider: Lizet Tyson DO  1. Stiffness of left shoulder joint  2. Adhesive capsulitis of left shoulder  3. Status post arthroscopy of shoulder      SUBJECTIVE  Patient reports he feels his shoulder is gradually improving but still with consistent soreness. OBJECTIVE  Modality:   []  E-Stim: type _ x _ min     []att   []unatt   []w/US   []w/ice   []w/heat  []  Ultrasound: []cont   []pulse    _ W/cm2 x _  min   []1MHz   []3MHz  []  Ice pack _  Post    declines   [x] Hot pack _  Pre       []  Other:    Man: 15 min    Short/long axis GH mobilization to the posterior/inferior capsule in combination with PROM into all directions as tolerated while supine/sidelying. Long axis myofascial traction and unwinding performed in supine. NMR:  min  Neuromuscular reeducation/proprioceptive training listed in exercise below. Ex: 30 min  Therapeutic exercise/strength/endurance completed here in clinic today per the exercise log. osteokinematic and arthrokinematic exercises for the shoulder in supine and sitting. Active elevation to 120 with manual overpressure. PT Exercise Log         EXERCISE 2022    pulley Y- seated   Ball roll tg   DAP ecc scap 5#   Active IR/ER peach   UBE 3' fwd                                                                    Ex: 30 min  Man: 15 min  NMR:  min    ASSESSMENT  [x]  See Plan of Care  [x]  Patient will continue to benefit from skilled therapy to address remaining functional deficits:     Patient continues with significant restrictions in all directions but showing some gradual improvements in active elevation. Active and passive ER/IR limited.     PLAN  Continue with current plan of care and progress as appropriate towards functional goals.   [x]  Upgrade activities as tolerated     [x]  Continue plan of care  []  Discharge due to:_  [] Other:_       Eder Kirby PT, DPT  9/13/2022    12:21 PM

## 2022-09-16 ENCOUNTER — OFFICE VISIT (OUTPATIENT)
Dept: ORTHOPEDIC SURGERY | Age: 67
End: 2022-09-16
Payer: MEDICARE

## 2022-09-16 DIAGNOSIS — Z98.890 STATUS POST ARTHROSCOPY OF SHOULDER: ICD-10-CM

## 2022-09-16 DIAGNOSIS — M75.02 ADHESIVE CAPSULITIS OF LEFT SHOULDER: ICD-10-CM

## 2022-09-16 DIAGNOSIS — M25.612 STIFFNESS OF LEFT SHOULDER JOINT: Primary | ICD-10-CM

## 2022-09-16 PROCEDURE — 97140 MANUAL THERAPY 1/> REGIONS: CPT | Performed by: PHYSICAL THERAPIST

## 2022-09-16 PROCEDURE — 97110 THERAPEUTIC EXERCISES: CPT | Performed by: PHYSICAL THERAPIST

## 2022-09-16 NOTE — PROGRESS NOTES
Patient Name: Edgar Figueroa  Date:2022  : 1955  [x]  Patient  Verified  Payor: Bushra Hazard / Plan: [place] MEDICARE PART A & B / Product Type: Medicare /    Total Treatment Time (min): 60  1:1 Treatment Time ( [address] only): 45  Referring provider: Francisco Dailey DO  1. Stiffness of left shoulder joint  2. Adhesive capsulitis of left shoulder  3. Status post arthroscopy of shoulder      SUBJECTIVE  Patient states that shoulder has been \"sore and stiff. \"    OBJECTIVE  Modality:   []  E-Stim: type _ x _ min     []att   []unatt   []w/US   []w/ice   []w/heat  []  Ultrasound: []cont   []pulse    _ W/cm2 x _  min   []1MHz   []3MHz  [x]  Ice pack _  Post      [x] Hot pack _  Pre       []  Other:    Man: 25 min    Short/long axis GH mobilization to the posterior/inferior capsule in combination with PROM into all directions as tolerated while supine. Long axis myofascial traction and unwinding performed in supine. NMR:  min  Neuromuscular reeducation/proprioceptive training listed in exercise below. Ex: 15 min  Therapeutic exercise/strength/endurance completed here in clinic today per the exercise log. PT Exercise Log         EXERCISE 2022    pulley Y- seated   Ball roll tg   DAP ecc scap 5#   Active IR/ER peach   UBE 3'/3'   1/2 foam bar y                                                                Ex: 15 min  Man: 25 min  NMR:  min    ASSESSMENT  [x]  See Plan of Care  [x]  Patient will continue to benefit from skilled therapy to address remaining functional deficits:     Significant restrictions remain in all directions limiting mobility. PLAN  Continue with current plan of care and progress as appropriate towards functional goals.   [x]  Upgrade activities as tolerated     [x]  Continue plan of care  []  Discharge due to:_  [] Other:_       Deloras Baumgarten, PT, DPT  2022    12:21 PM

## 2022-09-20 ENCOUNTER — OFFICE VISIT (OUTPATIENT)
Dept: ORTHOPEDIC SURGERY | Age: 67
End: 2022-09-20
Payer: MEDICARE

## 2022-09-20 DIAGNOSIS — M25.612 STIFFNESS OF LEFT SHOULDER JOINT: Primary | ICD-10-CM

## 2022-09-20 DIAGNOSIS — M75.02 ADHESIVE CAPSULITIS OF LEFT SHOULDER: ICD-10-CM

## 2022-09-20 DIAGNOSIS — Z98.890 STATUS POST ARTHROSCOPY OF SHOULDER: ICD-10-CM

## 2022-09-20 PROCEDURE — 97110 THERAPEUTIC EXERCISES: CPT | Performed by: PHYSICAL THERAPIST

## 2022-09-20 PROCEDURE — 97140 MANUAL THERAPY 1/> REGIONS: CPT | Performed by: PHYSICAL THERAPIST

## 2022-09-20 NOTE — PROGRESS NOTES
Patient Name: Mariangel Negrete  Date:2022  : 1955  [x]  Patient  Verified  Payor: Parag Horner / Plan: [place] MEDICARE PART A & B / Product Type: Medicare /    Total Treatment Time (min): 60  1:1 Treatment Time ( [address] only): 45  Referring provider: Jes Cai DO  1. Stiffness of left shoulder joint  2. Adhesive capsulitis of left shoulder  3. Status post arthroscopy of shoulder      SUBJECTIVE  Appropriate soreness after increased mobilization last session but at times states pain seems somewhat improved. OBJECTIVE  Modality:   []  E-Stim: type _ x _ min     []att   []unatt   []w/US   []w/ice   []w/heat  []  Ultrasound: []cont   []pulse    _ W/cm2 x _  min   []1MHz   []3MHz  [x]  Ice pack _  Post      [x] Hot pack _  Pre       []  Other:    Man: 25 min    Short/long axis GH mobilization to the posterior/inferior capsule in combination with PROM into all directions as tolerated while supine. Long axis myofascial traction and unwinding performed in supine. NMR:  min  Neuromuscular reeducation/proprioceptive training listed in exercise below. Ex: 15 min  Therapeutic exercise/strength/endurance completed here in clinic today per the exercise log. AROM elevation 125             PT Exercise Log         EXERCISE 2022    pulley Y- seated   Ball roll tg   DAP ecc scap 5#   Active IR/ER peach   UBE 3'/3'   1/2 foam bar y                                                                Ex: 15 min  Man: 25 min  NMR:  min    ASSESSMENT  [x]  See Plan of Care  [x]  Patient will continue to benefit from skilled therapy to address remaining functional deficits:     Slowly showing objective improvements in active elevation but still with clear capsular restrictions in all directions. PLAN  Continue with current plan of care and progress as appropriate towards functional goals.   [x]  Upgrade activities as tolerated     [x]  Continue plan of care  []  Discharge due to:_  [] Other:_       Loel Nip Nigel PT, DPT  9/20/2022    12:21 PM

## 2022-09-23 ENCOUNTER — OFFICE VISIT (OUTPATIENT)
Dept: ORTHOPEDIC SURGERY | Age: 67
End: 2022-09-23
Payer: MEDICARE

## 2022-09-23 DIAGNOSIS — Z98.890 STATUS POST ARTHROSCOPY OF SHOULDER: ICD-10-CM

## 2022-09-23 DIAGNOSIS — M25.612 STIFFNESS OF LEFT SHOULDER JOINT: Primary | ICD-10-CM

## 2022-09-23 DIAGNOSIS — M75.02 ADHESIVE CAPSULITIS OF LEFT SHOULDER: ICD-10-CM

## 2022-09-23 PROCEDURE — 97110 THERAPEUTIC EXERCISES: CPT | Performed by: PHYSICAL THERAPIST

## 2022-09-23 PROCEDURE — 97140 MANUAL THERAPY 1/> REGIONS: CPT | Performed by: PHYSICAL THERAPIST

## 2022-09-23 NOTE — PROGRESS NOTES
Patient Name: Army Silver  Date:2022  : 1955  [x]  Patient  Verified  Payor: Jonny Racer / Plan: [place] MED[place] / Product Type: Medicare /    Total Treatment Time (min): 60  1:1 Treatment Time ([facility] only): 30  Referring provider: Gopal Buchanan DO  1. Stiffness of left shoulder joint  2. Adhesive capsulitis of left shoulder  3. Status post arthroscopy of shoulder      SUBJECTIVE  Patient states he has been a bit more sore than normal recently but also feels he is making some continued new improvements in mobility and function as well. OBJECTIVE  Modality:   []  E-Stim: type _ x _ min     []att   []unatt   []w/US   []w/ice   []w/heat  []  Ultrasound: []cont   []pulse    _ W/cm2 x _  min   []1MHz   []3MHz  []  Ice pack _  Post    declines  [] Hot pack _  Pre       []  Other:    Man: 15 min    Short/long axis GH mobilization to the posterior/inferior capsule in combination with PROM into all directions as tolerated while supine. Long axis myofascial traction and unwinding performed in supine. NMR:  min  Neuromuscular reeducation/proprioceptive training listed in exercise below. Ex: 15 min  Therapeutic exercise/strength/endurance completed here in clinic today per the exercise log. Low load and prolonged ER passive flexibility exercises while supine with instructions for self completion at home as well. PT Exercise Log         EXERCISE 2022    pulley Y- seated   Ball roll tg   DAP ecc scap 5#   Active IR/ER peach   UBE 3'/3'   1/2 foam bar y   IR belt y                                                            Ex: 15 min  Man: 15 min  NMR:  min    ASSESSMENT  [x]  See Plan of Care  [x]  Patient will continue to benefit from skilled therapy to address remaining functional deficits:     Still with clear capsular restrictions although his passive elevation and IR do seem to be slowly improving consistent with his subjective reports.     PLAN  Continue with current plan of care and progress as appropriate towards functional goals.   [x]  Upgrade activities as tolerated     [x]  Continue plan of care  []  Discharge due to:_  [] Other:_       Neetu Toscano PT, DPT  9/23/2022    12:21 PM

## 2022-09-27 ENCOUNTER — OFFICE VISIT (OUTPATIENT)
Dept: ORTHOPEDIC SURGERY | Age: 67
End: 2022-09-27
Payer: MEDICARE

## 2022-09-27 DIAGNOSIS — Z98.890 STATUS POST ARTHROSCOPY OF SHOULDER: ICD-10-CM

## 2022-09-27 DIAGNOSIS — M75.02 ADHESIVE CAPSULITIS OF LEFT SHOULDER: ICD-10-CM

## 2022-09-27 DIAGNOSIS — M25.612 STIFFNESS OF LEFT SHOULDER JOINT: Primary | ICD-10-CM

## 2022-09-27 PROCEDURE — 97110 THERAPEUTIC EXERCISES: CPT

## 2022-09-27 PROCEDURE — 97140 MANUAL THERAPY 1/> REGIONS: CPT

## 2022-09-27 NOTE — PROGRESS NOTES
I have reviewed the notes, assessments, and/or procedures performed by Dorota Maza PTA, I concur with her/his documentation of Claudio Mckeon.

## 2022-09-27 NOTE — PROGRESS NOTES
Patient Name: Teri Peralta  Date:2022  : 1955  [x]  Patient  Verified  Payor: Emmanuel Risk / Plan: [place] MEDICARE PART A & B / Product Type: Medicare /    Total Treatment Time (min): 60  1:1 Treatment Time ( [address] only): 30  Referring provider: Jannette Jacques DO    1. Stiffness of left shoulder joint  2. Adhesive capsulitis of left shoulder  3. Status post arthroscopy of shoulder    SUBJECTIVE    Patient states he has been working on IR/ER stretching at home and has been quite sore. OBJECTIVE  Modality:   []  E-Stim: type _ x _ min     []att   []unatt   []w/US   []w/ice   []w/heat  []  Ultrasound: []cont   []pulse    _ W/cm2 x _  min   []1MHz   []3MHz  []  Ice pack _  Post    declines  [] Hot pack _  Pre       []  Other:    Man: 15 min  Short/long axis GH mobilization to the posterior/inferior capsule in combination with PROM into all directions as tolerated while supine. Long axis myofascial traction and unwinding performed in supine. Contract-relax stretching for ER mobility. STM/MFR to subscap/teres/lats. NMR:  min  Neuromuscular reeducation/proprioceptive training listed in exercise below. Ex: 15 min  Therapeutic exercise/strength/endurance completed here in clinic today per the exercise log. PT Exercise Log         EXERCISE 2022    pulley Y- seated   Ball roll TG   DAP ecc scap 5#   Active IR/ER peach   UBE 3'/3'   1/2 foam bar y   IR belt y                                                            Ex: 15 min  Man: 15 min  NMR:  min    ASSESSMENT  [x]  See Plan of Care  [x]  Patient will continue to benefit from skilled therapy to address remaining functional deficits:     He does show improvements in mobility with contract-relax techniques and LLPS here in the clinic. He is quite restricted through subscap/teres/lats. PLAN  Continue with current plan of care and progress as appropriate towards functional goals.   [x]  Upgrade activities as tolerated     [x]  Continue plan of care  []  Discharge due to:_  [] Other:_       Jean-Pierre Badillo, MARION  9/27/2022    12:21 PM

## 2022-09-30 ENCOUNTER — OFFICE VISIT (OUTPATIENT)
Dept: ORTHOPEDIC SURGERY | Age: 67
End: 2022-09-30
Payer: MEDICARE

## 2022-09-30 DIAGNOSIS — M75.02 ADHESIVE CAPSULITIS OF LEFT SHOULDER: ICD-10-CM

## 2022-09-30 DIAGNOSIS — M25.612 STIFFNESS OF LEFT SHOULDER JOINT: Primary | ICD-10-CM

## 2022-09-30 PROCEDURE — 97110 THERAPEUTIC EXERCISES: CPT | Performed by: PHYSICAL THERAPIST

## 2022-09-30 PROCEDURE — 97140 MANUAL THERAPY 1/> REGIONS: CPT | Performed by: PHYSICAL THERAPIST

## 2022-09-30 NOTE — PROGRESS NOTES
Patient Name: Karmen De  Date:2022  : 1955  [x]  Patient  Verified  Payor: Tiffanie Burkett / Plan: [place] MEDICARE PART A & B / Product Type: Medicare /    Total Treatment Time (min): 60  1:1 Treatment Time ( [address] only): 40  Referring provider: Giselle Hathaway DO    1. Stiffness of left shoulder joint  2. Adhesive capsulitis of left shoulder    SUBJECTIVE    Patient reports he has been noticing some improvements in decreasing pain with increasing range of motion recently. OBJECTIVE  Modality:   []  E-Stim: type _ x _ min     []att   []unatt   []w/US   []w/ice   []w/heat  []  Ultrasound: []cont   []pulse    _ W/cm2 x _  min   []1MHz   []3MHz  []  Ice pack _  Post    declines  [x] Hot pack _  Pre       []  Other:    Man: 15 min  Short/long axis GH mobilization to the posterior/inferior capsule in combination with PROM into all directions as tolerated while supine. Long axis myofascial traction and unwinding performed in supine. Contract-relax stretching for ER mobility. STM/MFR to subscap/teres/lats. NMR:  min  Neuromuscular reeducation/proprioceptive training listed in exercise below. Ex: 25 min  Therapeutic exercise/strength/endurance completed here in clinic today per the exercise log. PT Exercise Log         EXERCISE 2022    pulley Y- seated   Ball roll TG   DAP ecc scap 5#   Active IR/ER peach   UBE 3'/3'   1/2 foam bar y   IR belt y   Foam roll at wall y                                                        Ex: 25 min  Man: 15 min  NMR:  min    ASSESSMENT  [x]  See Plan of Care  [x]  Patient will continue to benefit from skilled therapy to address remaining functional deficits:     Still with significant capsular limitations in all directions but patient subjectively noticing recent improvements. PLAN  Continue with current plan of care and progress as appropriate towards functional goals.   [x]  Upgrade activities as tolerated     [x]  Continue plan of care  []  Discharge due to:_  [] Other:_       Andra Juarez, PT, DPT  9/30/2022    12:21 PM

## 2022-10-04 ENCOUNTER — OFFICE VISIT (OUTPATIENT)
Dept: ORTHOPEDIC SURGERY | Age: 67
End: 2022-10-04
Payer: MEDICARE

## 2022-10-04 DIAGNOSIS — Z98.890 STATUS POST ARTHROSCOPY OF SHOULDER: ICD-10-CM

## 2022-10-04 DIAGNOSIS — M25.612 STIFFNESS OF LEFT SHOULDER JOINT: Primary | ICD-10-CM

## 2022-10-04 DIAGNOSIS — M75.02 ADHESIVE CAPSULITIS OF LEFT SHOULDER: ICD-10-CM

## 2022-10-04 PROCEDURE — 97110 THERAPEUTIC EXERCISES: CPT | Performed by: PHYSICAL THERAPIST

## 2022-10-04 PROCEDURE — 97140 MANUAL THERAPY 1/> REGIONS: CPT | Performed by: PHYSICAL THERAPIST

## 2022-10-04 NOTE — PROGRESS NOTES
Patient Name: Nakul Flaherty  Date:10/4/2022  : 1955  [x]  Patient  Verified  Payor: Radhames Bean / Plan: [place] MEDICARE PART A & B / Product Type: Medicare /    Total Treatment Time (min): 60  1:1 Treatment Time ( [address] only): 40  Referring provider: Yan Gautam,     1. Stiffness of left shoulder joint  2. Adhesive capsulitis of left shoulder  3. Status post arthroscopy of shoulder    SUBJECTIVE    Patient reports he has noticed some \"tightness/knot\" through his left upper trapezius. OBJECTIVE  Modality:   []  E-Stim: type _ x _ min     []att   []unatt   []w/US   []w/ice   []w/heat  []  Ultrasound: []cont   []pulse    _ W/cm2 x _  min   []1MHz   []3MHz  []  Ice pack _  Post    declines  [] Hot pack _  Pre       []  Other:    Man: 15 min  Short/long axis GH mobilization to the posterior/inferior capsule in combination with PROM into all directions as tolerated while supine. Long axis myofascial traction and unwinding performed in supine. STM/MFR to subscap/teres/lats. Cervical/thoracic manipulation performed in supine with consent and cavitation. NMR:  min  Neuromuscular reeducation/proprioceptive training listed in exercise below. Ex: 25 min  Therapeutic exercise/strength/endurance completed here in clinic today per the exercise log. PT Exercise Log         EXERCISE 10/4/2022    pulley Y- seated   Ball roll TG   DAP ecc scap 5#   Active IR/ER org   UBE 3'/3'   1/2 foam bar y   IR belt y   Foam roll at wall y   Scap row blue                                                    Ex: 25 min  Man: 15 min  NMR:  min    ASSESSMENT  [x]  See Plan of Care  [x]  Patient will continue to benefit from skilled therapy to address remaining functional deficits:     Cervical/thoracic manipulation seems to help his upper trapezius \"tightness. \"  Still benefits from skilled PT mobilize capsular [address] restrictions.        PLAN  Continue with current plan of care and progress as appropriate towards functional goals.  [x]  Upgrade activities as tolerated     [x]  Continue plan of care  []  Discharge due to:_  [] Other:_       Artem Rodriguez, PT, DPT  10/4/2022    12:21 PM no

## 2022-10-07 ENCOUNTER — OFFICE VISIT (OUTPATIENT)
Dept: ORTHOPEDIC SURGERY | Age: 67
End: 2022-10-07
Payer: MEDICARE

## 2022-10-07 DIAGNOSIS — M25.612 STIFFNESS OF LEFT SHOULDER JOINT: Primary | ICD-10-CM

## 2022-10-07 DIAGNOSIS — Z98.890 STATUS POST ARTHROSCOPY OF SHOULDER: ICD-10-CM

## 2022-10-07 DIAGNOSIS — M75.02 ADHESIVE CAPSULITIS OF LEFT SHOULDER: ICD-10-CM

## 2022-10-07 PROCEDURE — 97140 MANUAL THERAPY 1/> REGIONS: CPT

## 2022-10-07 PROCEDURE — 97110 THERAPEUTIC EXERCISES: CPT

## 2022-10-07 NOTE — PROGRESS NOTES
Patient Name: Naomie Silva  Date:10/7/2022  : 1955  [x]  Patient  Verified  Payor: Zara Lino / Plan: [place] MEDICARE PART A & B / Product Type: Medicare /    Total Treatment Time (min): 60  1:1 Treatment Time ( [address] only): 40  Referring provider: Valerie Almaraz DO    1. Stiffness of left shoulder joint  2. Adhesive capsulitis of left shoulder  3. Status post arthroscopy of shoulder    SUBJECTIVE    Patient states his shoulder is feeling more flexible and less sore. OBJECTIVE  Modality:   []  E-Stim: type _ x _ min     []att   []unatt   []w/US   []w/ice   []w/heat  []  Ultrasound: []cont   []pulse    _ W/cm2 x _  min   []1MHz   []3MHz  []  Ice pack _  Post    declines  []  Hot pack _  Pre       []  Other:    Man: 15 min  Short/long axis GH mobilization to the posterior/inferior capsule in combination with PROM into all directions as tolerated while supine. Long axis myofascial traction and unwinding performed in supine. STM/MFR to subscap/teres/lats. NMR:  min  Neuromuscular reeducation/proprioceptive training listed in exercise below. Ex: 25 min  Therapeutic exercise/strength/endurance completed here in clinic today per the exercise log. PT Exercise Log         EXERCISE 10/7/2022    pulley Y- seated   Ball roll TG   DAP ecc scap 5#   Active IR/ER org   UBE 3'/3'   1/2 foam bar y   IR belt y   Foam roll at wall y   Scap row blue                                              Doorway ER stretching added to HEP    ASSESSMENT  [x]  See Plan of Care  [x]  Patient will continue to benefit from skilled therapy to address remaining functional deficits:     Still with capsular restrictions all directions, particularly ER, as well as subscap/teres tightness. Better GH mobility with short-axis mobilizations. PLAN  Continue with current plan of care and progress as appropriate towards functional goals.   [x]  Upgrade activities as tolerated     [x]  Continue plan of care  []  Discharge due to:_  [] Other:_       Kevin Clements, PTA  10/7/2022    12:21 PM

## 2022-10-07 NOTE — PROGRESS NOTES
I have reviewed the notes, assessments, and/or procedures performed by Kevin Clements PTA, I concur with her/his documentation of Claudio Mckeon.

## 2022-10-11 ENCOUNTER — OFFICE VISIT (OUTPATIENT)
Dept: ORTHOPEDIC SURGERY | Age: 67
End: 2022-10-11
Payer: MEDICARE

## 2022-10-11 DIAGNOSIS — M25.612 STIFFNESS OF LEFT SHOULDER JOINT: Primary | ICD-10-CM

## 2022-10-11 DIAGNOSIS — Z98.890 STATUS POST ARTHROSCOPY OF SHOULDER: ICD-10-CM

## 2022-10-11 DIAGNOSIS — M75.02 ADHESIVE CAPSULITIS OF LEFT SHOULDER: ICD-10-CM

## 2022-10-11 PROCEDURE — 97140 MANUAL THERAPY 1/> REGIONS: CPT

## 2022-10-11 PROCEDURE — 97110 THERAPEUTIC EXERCISES: CPT

## 2022-10-11 NOTE — PROGRESS NOTES
I have reviewed the notes, assessments, and/or procedures performed by Naya Santos PTA, I concur with her/his documentation of Claudio Mckeon.

## 2022-10-11 NOTE — PROGRESS NOTES
Patient Name: Gaylin Lesches  Date:10/11/2022  : 1955  [x]  Patient  Verified  Payor: Everett Wyatt / Plan: [place] MEDICARE PART A & B / Product Type: Medicare /    Total Treatment Time (min): 60  1:1 Treatment Time ( [address] only): 30  Referring provider: Hung Valdez DO    1. Stiffness of left shoulder joint  2. Adhesive capsulitis of left shoulder  3. Status post arthroscopy of shoulder    SUBJECTIVE    Patient states his shoulder is still sore, but seems to be improving. OBJECTIVE  Modality:   []  E-Stim: type _ x _ min     []att   []unatt   []w/US   []w/ice   []w/heat  []  Ultrasound: []cont   []pulse    _ W/cm2 x _  min   []1MHz   []3MHz  []  Ice pack _  Post    declines  []  Hot pack _  Pre       []  Other:    Man: 15 min  Short/long axis GH mobilization to the posterior/inferior capsule in combination with PROM into all directions as tolerated while supine. Long axis myofascial traction and unwinding performed in supine. STM/MFR to subscap/teres/lats. Contract-relax stretching for ER mobility. NMR:  min  Neuromuscular reeducation/proprioceptive training listed in exercise below. Ex: 15 min  Therapeutic exercise/strength/endurance completed here in clinic today per the exercise log. PT Exercise Log         EXERCISE 10/11/2022    pulley Y- seated   Ball roll TG   DAP ecc scap 5#   Active IR/ER org   UBE 3'/3'   1/2 foam bar y   IR belt y   Foam roll at wall y   Scap row blue                                                  ASSESSMENT  [x]  See Plan of Care  [x]  Patient will continue to benefit from skilled therapy to address remaining functional deficits:     Inferior [address] mobility improving but still with clear mobility loss and scapular compensation into all directions. PLAN  Continue with current plan of care and progress as appropriate towards functional goals.   [x]  Upgrade activities as tolerated     [x]  Continue plan of care  []  Discharge due to:_  [] Other:_       Cait Santiago Arlin, PTA  10/11/2022    12:21 PM

## 2022-10-14 ENCOUNTER — OFFICE VISIT (OUTPATIENT)
Dept: ORTHOPEDIC SURGERY | Age: 67
End: 2022-10-14
Payer: MEDICARE

## 2022-10-14 DIAGNOSIS — M75.02 ADHESIVE CAPSULITIS OF LEFT SHOULDER: ICD-10-CM

## 2022-10-14 DIAGNOSIS — Z98.890 STATUS POST ARTHROSCOPY OF SHOULDER: ICD-10-CM

## 2022-10-14 DIAGNOSIS — M25.612 STIFFNESS OF LEFT SHOULDER JOINT: Primary | ICD-10-CM

## 2022-10-14 PROCEDURE — 97140 MANUAL THERAPY 1/> REGIONS: CPT | Performed by: PHYSICAL THERAPIST

## 2022-10-14 PROCEDURE — 97110 THERAPEUTIC EXERCISES: CPT | Performed by: PHYSICAL THERAPIST

## 2022-10-14 NOTE — PROGRESS NOTES
Patient Name: Sai Mason  Date:10/14/2022  : 1955  [x]  Patient  Verified  Payor: Cierra Raymundo / Plan: [place] MEDICARE PART A & B / Product Type: Medicare /    Total Treatment Time (min): 60  1:1 Treatment Time ( [address] only): 40  Referring provider: hSaun Del Real DO    1. Stiffness of left shoulder joint  2. Adhesive capsulitis of left shoulder  3. Status post arthroscopy of shoulder    SUBJECTIVE    Patient reports his shoulder is still painful with stretching/reaching certain directions, but he feels mobility is improving. OBJECTIVE  Modality:   []  E-Stim: type _ x _ min     []att   []unatt   []w/US   []w/ice   []w/heat  []  Ultrasound: []cont   []pulse    _ W/cm2 x _  min   []1MHz   []3MHz  []  Ice pack _  Post    declines  []  Hot pack _  Pre       []  Other:    Man: 15 min  Short/long axis GH mobilization to the posterior/inferior capsule in combination with PROM into all directions as tolerated while supine. Long axis myofascial traction and unwinding performed in supine. STM/MFR to subscap/teres/lats. Contract-relax stretching for ER mobility. NMR:  min  Neuromuscular reeducation/proprioceptive training listed in exercise below. Ex: 25 min  Therapeutic exercise/strength/endurance completed here in clinic today per the exercise log. PT Exercise Log         EXERCISE 10/14/2022    pulley Y- seated   Ball roll TG   DAP ecc scap 5#   Active IR/ER org   UBE 3'/3'   1/2 foam bar y   IR belt y   Foam roll at wall y   Scap row blue   AAROM w/stick 2#   Ant/post delt org                                          ASSESSMENT  [x]  See Plan of Care  [x]  Patient will continue to benefit from skilled therapy to address remaining functional deficits:     Better ER ROM compared to earlier this week with short-axis mobilizations and contract-relax techniques. Challenged by deltoid strengthening introduced today.     PLAN  Continue with current plan of care and progress as appropriate towards functional goals.   [x]  Upgrade activities as tolerated     [x]  Continue plan of care  []  Discharge due to:_  [] Other:_       Hollie Hudson, AMRION  10/14/2022    12:21 PM

## 2022-10-14 NOTE — PROGRESS NOTES
I have reviewed the notes, assessments, and/or procedures performed by Sangeeta Moreira PTA, I concur with her/his documentation of Claudio Mckeon.

## 2022-10-17 ENCOUNTER — OFFICE VISIT (OUTPATIENT)
Dept: ORTHOPEDIC SURGERY | Age: 67
End: 2022-10-17
Payer: MEDICARE

## 2022-10-17 VITALS — HEIGHT: 67 IN | BODY MASS INDEX: 25.58 KG/M2 | WEIGHT: 163 LBS

## 2022-10-17 DIAGNOSIS — Z98.890 STATUS POST ARTHROSCOPY OF SHOULDER: ICD-10-CM

## 2022-10-17 DIAGNOSIS — M75.02 ADHESIVE CAPSULITIS OF LEFT SHOULDER: Primary | ICD-10-CM

## 2022-10-17 PROCEDURE — 99024 POSTOP FOLLOW-UP VISIT: CPT | Performed by: ORTHOPAEDIC SURGERY

## 2022-10-17 NOTE — PROGRESS NOTES
Cesia Jimenes (: 1955) is a 73 y.o. male, established patient, here for evaluation of the following chief complaint(s):  Shoulder Pain       ASSESSMENT/PLAN:  Below is the assessment and plan developed based on review of pertinent history, physical exam, labs, studies, and medications. Overall he seems to be progressing very well. He would like to progress to doing home exercises at this time. If he has any setbacks he will let us know and we can get him back into physical therapy. 1. Adhesive capsulitis of left shoulder  2. Status post arthroscopy of shoulder      Return if symptoms worsen or fail to improve. SUBJECTIVE/OBJECTIVE:  Claudio Mckeon (: 1955) is a 73 y.o. male. He presents today for follow-up 2 months status post capsular release of the left shoulder. He notes that he has returned to most normal daily activities and has functional range of motion of the shoulder. He has been working with physical therapy. No Known Allergies    Current Outpatient Medications   Medication Sig    ondansetron hcl (Zofran) 4 mg tablet Take 1 Tablet by mouth every eight (8) hours as needed for Nausea or Vomiting.    losartan (COZAAR) 100 mg tablet Take 100 mg by mouth in the morning. cyanocobalamin (VITAMIN B12) 100 mcg tablet Take 100 mcg by mouth in the morning.    multivitamin (ONE A DAY) tablet Take 1 Tablet by mouth in the morning. pravastatin (PRAVACHOL) 20 mg tablet Take 20 mg by mouth nightly. fenofibrate (LOFIBRA) 160 mg tablet Take 160 mg by mouth in the morning. dapagliflozin (FARXIGA) 5 mg tab tablet Take 5 mg by mouth in the morning. hydroCHLOROthiazide (HYDRODIURIL) 12.5 mg tablet Take 12.5 mg by mouth in the morning. meloxicam (MOBIC) 15 mg tablet TAKE 1 TABLET BY MOUTH DAILY    glimepiride (AMARYL) 4 mg tablet Take 6 mg by mouth every morning. SITagliptin-metFORMIN (Janumet) 50-1,000 mg per tablet Take 2 Tablets by mouth Daily (before dinner).     aspirin 81 mg chewable tablet Take 81 mg by mouth daily. No current facility-administered medications for this visit. Social History     Socioeconomic History    Marital status:      Spouse name: Not on file    Number of children: Not on file    Years of education: Not on file    Highest education level: Not on file   Occupational History    Not on file   Tobacco Use    Smoking status: Never     Passive exposure: Never    Smokeless tobacco: Never   Vaping Use    Vaping Use: Never used   Substance and Sexual Activity    Alcohol use: Not Currently     Comment: maybe once a month a glass of wine    Drug use: Never    Sexual activity: Not on file   Other Topics Concern    Not on file   Social History Narrative    Not on file     Social Determinants of Health     Financial Resource Strain: Not on file   Food Insecurity: Not on file   Transportation Needs: Not on file   Physical Activity: Not on file   Stress: Not on file   Social Connections: Not on file   Intimate Partner Violence: Not on file   Housing Stability: Not on file       Past Surgical History:   Procedure Laterality Date    HX CATARACT REMOVAL Bilateral     HX HEENT      retinal tear procedure       History reviewed. No pertinent family history. REVIEW OF SYSTEMS:  ROS    Positive for: Musculoskeletal  Last edited by Tran Russell on 10/17/2022 10:13 AM.        Patient denies any recent fever, chills, nausea, vomiting, chest pain, or shortness of breath. Vitals:  Ht 5' 7\" (1.702 m)   Wt 163 lb (73.9 kg)   BMI 25.53 kg/m²    Body mass index is 25.53 kg/m². PHYSICAL EXAM:  General exam: Patient is awake, alert, and oriented x3. Well-appearing. No acute distress. Ambulates with a normal gait. Left shoulder: Active and passive forward flexion 140 degrees today. Internal rotation to the lumbar spine. Normal rotator cuff strength and function.     IMAGING:    XR Results (most recent):  Results from Appointment encounter on 03/30/22    XR SHOULDER LT AP/LAT MIN 2 V    Narrative  X-rays of the left shoulder 3 views done today show maintained glenohumeral joint space. No signs of acute fracture. Type II acromion. Mild AC joint space narrowing         No orders of the defined types were placed in this encounter. An electronic signature was used to authenticate this note.   -- Alida Valdez, DO

## 2022-10-17 NOTE — LETTER
10/17/2022    Patient: Dairl Mcburney   YOB: 1955   Date of Visit: 10/17/2022     Armando Aden NP  [address] [place] [phone]  Via Fax: [phone]    Dear Armando Aden NP,      Thank you for referring Mr. Donovan to [facility] for evaluation. My notes for this consultation are attached. If you have questions, please do not hesitate to call me. I look forward to following your patient along with you.       Sincerely,    Zechariah Rust, DO

## 2022-10-18 ENCOUNTER — OFFICE VISIT (OUTPATIENT)
Dept: ORTHOPEDIC SURGERY | Age: 67
End: 2022-10-18
Payer: MEDICARE

## 2022-10-18 DIAGNOSIS — M75.02 ADHESIVE CAPSULITIS OF LEFT SHOULDER: Primary | ICD-10-CM

## 2022-10-18 DIAGNOSIS — Z98.890 STATUS POST ARTHROSCOPY OF SHOULDER: ICD-10-CM

## 2022-10-18 DIAGNOSIS — M25.612 STIFFNESS OF LEFT SHOULDER JOINT: ICD-10-CM

## 2022-10-18 NOTE — PROGRESS NOTES
Patient Name: Amelia Mojica  Date:10/18/2022  : 1955  [x]  Patient  Verified  Payor: Ana Rubio / Plan: [place] MEDICARE PART A & B / Product Type: Medicare /    Total Treatment Time (min): 60  1:1 Treatment Time ( [address] only): 40  Referring provider: Uriel Felipe DO    1. Adhesive capsulitis of left shoulder  2. Stiffness of left shoulder joint  3. Status post arthroscopy of shoulder    SUBJECTIVE    Patient feeling better overall. States he will reduce to 1x/week after . States he feels stiff sometimes but not as bad as previously. OBJECTIVE    PROM: ER:50 degrees at 45 degrees abduction, elevation:160 degrees    Modality:   []  E-Stim: type _ x _ min     []att   []unatt   []w/US   []w/ice   []w/heat  []  Ultrasound: []cont   []pulse    _ W/cm2 x _  min   []1MHz   []3MHz  []  Ice pack _  Post    declines  []  Hot pack _  Pre       []  Other:    Man: 15 min  Short/long axis GH mobilization to the posterior/inferior capsule in combination with PROM into all directions as tolerated while supine. Long axis myofascial traction and unwinding performed in supine. STM/MFR to subscap/teres/lats. Scap mobs. NMR:  min  Neuromuscular reeducation/proprioceptive training listed in exercise below. Ex: 25 min  Therapeutic exercise/strength/endurance completed here in clinic today per the exercise log. PT Exercise Log         EXERCISE 10/18/2022    pulley Y- seated   Ball roll TG   DAP ecc scap 5#   Active IR/ER org   UBE 3'/3'   1/2 foam bar y   IR belt y   Foam roll at wall y   Scap row blue   AAROM w/stick 2#   Ant/post delt org   Chicken wings 20x                                      ASSESSMENT  [x]  See Plan of Care  [x]  Patient will continue to benefit from skilled therapy to address remaining functional deficits:     Patient continues to be stiff at endranges. Has increased ER PROM after scapular mobs and MFR. Slowly progressing. Encouraged patient to push himself at home. PLAN  Continue with current plan of care and progress as appropriate towards functional goals.   [x]  Upgrade activities as tolerated     [x]  Continue plan of care  []  Discharge due to:_  [] Other:_       Juli Barth, PT  10/18/2022    12:21 PM

## 2022-10-21 ENCOUNTER — OFFICE VISIT (OUTPATIENT)
Dept: ORTHOPEDIC SURGERY | Age: 67
End: 2022-10-21
Payer: MEDICARE

## 2022-10-21 DIAGNOSIS — M25.612 STIFFNESS OF LEFT SHOULDER JOINT: ICD-10-CM

## 2022-10-21 DIAGNOSIS — M75.02 ADHESIVE CAPSULITIS OF LEFT SHOULDER: Primary | ICD-10-CM

## 2022-10-21 DIAGNOSIS — Z98.890 STATUS POST ARTHROSCOPY OF SHOULDER: ICD-10-CM

## 2022-10-21 PROCEDURE — 97140 MANUAL THERAPY 1/> REGIONS: CPT

## 2022-10-21 PROCEDURE — 97110 THERAPEUTIC EXERCISES: CPT

## 2022-10-21 NOTE — PROGRESS NOTES
Patient Name: Martin Joseph  Date:10/21/2022  : 1955  [x]  Patient  Verified  Payor: Demetrius Beets / Plan: [place] / Product Type: Medicare /    Total Treatment Time (min): 60  1:1 Treatment Time ( [address] only): 40  Referring provider: Frankie Henley DO    1. Adhesive capsulitis of left shoulder  2. Stiffness of left shoulder joint  3. Status post arthroscopy of shoulder    SUBJECTIVE    Patient reports his tolerance to stretching/exercises seems to be improving as ROM gets better. OBJECTIVE    Modality:   []  E-Stim: type _ x _ min     []att   []unatt   []w/US   []w/ice   []w/heat  []  Ultrasound: []cont   []pulse    _ W/cm2 x _  min   []1MHz   []3MHz  []  Ice pack _  Post    declines  []  Hot pack _  Pre       []  Other:    Man: 15 min  Short/long axis GH mobilization to the posterior/inferior capsule in combination with PROM into all directions as tolerated while supine. Long axis myofascial traction and unwinding performed in supine. STM/MFR to subscap/teres/lats. Resisted PNF diagonals. NMR:  min  Neuromuscular reeducation/proprioceptive training listed in exercise below. Ex: 25 min  Therapeutic exercise/strength/endurance completed here in clinic today per the exercise log. PT Exercise Log         EXERCISE 10/21/2022    pulley Y- seated   Ball roll TG   DAP ecc scap 5#   Active IR/ER org   UBE 3'/3'   1/2 foam bar y   IR belt y   Foam roll at wall y   Scap row blue   AAROM w/stick 2#   Ant/post delt org   Chicken wings 20x                                      ASSESSMENT  [x]  See Plan of Care  [x]  Patient will continue to benefit from skilled therapy to address remaining functional deficits:     ER/IR improving with consistent therapy attendance and HEP compliance. Still with scapular restrictions and mobility loss. PLAN  Continue with current plan of care and progress as appropriate towards functional goals.   [x]  Upgrade activities as tolerated     [x]  Continue plan of care  []  Discharge due to:_  [] Other:_       Callie Boo, MARION  10/21/2022    12:21 PM

## 2022-10-21 NOTE — PROGRESS NOTES
I have reviewed the notes, assessments, and/or procedures performed by Irasema Umana PTA, I concur with her/his documentation of Claudio Mckeon.

## 2022-10-31 ENCOUNTER — OFFICE VISIT (OUTPATIENT)
Dept: ORTHOPEDIC SURGERY | Age: 67
End: 2022-10-31
Payer: MEDICARE

## 2022-10-31 DIAGNOSIS — Z98.890 STATUS POST ARTHROSCOPY OF SHOULDER: ICD-10-CM

## 2022-10-31 DIAGNOSIS — M25.612 STIFFNESS OF LEFT SHOULDER JOINT: ICD-10-CM

## 2022-10-31 DIAGNOSIS — M75.02 ADHESIVE CAPSULITIS OF LEFT SHOULDER: Primary | ICD-10-CM

## 2022-10-31 PROCEDURE — 97110 THERAPEUTIC EXERCISES: CPT | Performed by: PHYSICAL THERAPIST

## 2022-10-31 PROCEDURE — 97140 MANUAL THERAPY 1/> REGIONS: CPT | Performed by: PHYSICAL THERAPIST

## 2022-10-31 NOTE — PROGRESS NOTES
Patient Name: Amelia Mojica  Date:10/31/2022  : 1955  [x]  Patient  Verified  Payor: Ana Rubio / Plan: [place] / Product Type: Medicare /    Total Treatment Time (min): 60  1:1 Treatment Time ( [address] only): 40  Referring provider: Uriel Felipe DO    1. Adhesive capsulitis of left shoulder  2. Stiffness of left shoulder joint  3. Status post arthroscopy of shoulder    SUBJECTIVE    Still having some difficulty with end range IR/ER but overall feels ROM is improving and pain is decreasing. OBJECTIVE    Modality:   []  E-Stim: type _ x _ min     []att   []unatt   []w/US   []w/ice   []w/heat  []  Ultrasound: []cont   []pulse    _ W/cm2 x _  min   []1MHz   []3MHz  []  Ice pack _  Post    declines  []  Hot pack _  Pre       []  Other:    Man: 15 min  Short/long axis GH mobilization to the posterior/inferior capsule in combination with PROM into all directions as tolerated while supine. Long axis myofascial traction and unwinding performed in supine. STM/MFR to subscap/teres/lats. Resisted PNF diagonals. NMR:  min  Neuromuscular reeducation/proprioceptive training listed in exercise below. Ex: 15 min  Therapeutic exercise/strength/endurance completed here in clinic today per the exercise log. PT Exercise Log         EXERCISE 10/31/2022    pulley Y- seated   Ball roll TG   DAP ecc scap 5#   Active IR/ER org   UBE 3'/3'   1/2 foam bar y   IR belt y   Foam roll at wall y   Scap row blue   AAROM w/stick 2#   Ant/post delt org   Chicken wings 20x                                      ASSESSMENT  [x]  See Plan of Care  [x]  Patient will continue to benefit from skilled therapy to address remaining functional deficits:     Passive ER/IR improves with mobilization here in clinic but still with endrange restrictions. Overall though patient is demonstrating better functional mobility with decrease in subjective symptoms.     PLAN  Continue with current plan of care and progress as appropriate towards functional goals.   [x]  Upgrade activities as tolerated     [x]  Continue plan of care  []  Discharge due to:_  [] Other:_       Doris Vaughan PT, DPT  10/31/2022    12:21 PM

## 2022-11-03 ENCOUNTER — OFFICE VISIT (OUTPATIENT)
Dept: ORTHOPEDIC SURGERY | Age: 67
End: 2022-11-03
Payer: MEDICARE

## 2022-11-03 DIAGNOSIS — M75.02 ADHESIVE CAPSULITIS OF LEFT SHOULDER: Primary | ICD-10-CM

## 2022-11-03 DIAGNOSIS — Z98.890 STATUS POST ARTHROSCOPY OF SHOULDER: ICD-10-CM

## 2022-11-03 DIAGNOSIS — M25.612 STIFFNESS OF LEFT SHOULDER JOINT: ICD-10-CM

## 2022-11-03 PROCEDURE — 97110 THERAPEUTIC EXERCISES: CPT | Performed by: PHYSICAL THERAPIST

## 2022-11-03 PROCEDURE — 97140 MANUAL THERAPY 1/> REGIONS: CPT | Performed by: PHYSICAL THERAPIST

## 2022-11-03 NOTE — PROGRESS NOTES
Patient Name: Theo Reed  Date:11/3/2022  : 1955  [x]  Patient  Verified  Payor: Suzy Kim / Plan: [place] MED[place]ARE [place] / Product Type: Medicare /    Total Treatment Time (min): 60  1:1 Treatment Time ( [address] only): 40  Referring provider: Kade Rasmussen, DO    1. Adhesive capsulitis of left shoulder  2. Stiffness of left shoulder joint  3. Status post arthroscopy of shoulder    SUBJECTIVE    Shoulder feels ok. Mobility improving but still with some end range stiffness. OBJECTIVE    Modality:   []  E-Stim: type _ x _ min     []att   []unatt   []w/US   []w/ice   []w/heat  []  Ultrasound: []cont   []pulse    _ W/cm2 x _  min   []1MHz   []3MHz  []  Ice pack _  Post    declines  []  Hot pack _  Pre       []  Other:    Man: 15 min  Short/long axis GH mobilization to the posterior/inferior capsule in combination with PROM into all directions as tolerated while supine. Long axis myofascial traction and unwinding performed in supine. STM/MFR to subscap/teres/lats. NMR:  min  Neuromuscular reeducation/proprioceptive training listed in exercise below. Ex: 25 min  Therapeutic exercise/strength/endurance completed here in clinic today per the exercise log. PT Exercise Log         EXERCISE 11/3/2022    pulley Y- seated   Ball roll TG   DAP ecc scap 5#   Active IR/ER org   UBE 3'/3'   1/2 foam bar y   IR belt y   Foam roll at wall y   Scap row blue   AAROM w/stick 2#   Ant/post delt org   Chicken wings 20x                                      ASSESSMENT  [x]  See Plan of Care  [x]  Patient will continue to benefit from skilled therapy to address remaining functional deficits:     Subjectively feels better with ADLs but still with clear capsular restrictions that benefit with continued skilled PT mobilization. PLAN  Continue with current plan of care and progress as appropriate towards functional goals.   [x]  Upgrade activities as tolerated     [x]  Continue plan of care  []  Discharge due to:_  [] Other:_       Ruth Zamarripa, PT, DPT  11/3/2022    12:21 PM

## 2022-11-09 ENCOUNTER — OFFICE VISIT (OUTPATIENT)
Dept: ORTHOPEDIC SURGERY | Age: 67
End: 2022-11-09
Payer: MEDICARE

## 2022-11-09 DIAGNOSIS — M25.612 STIFFNESS OF LEFT SHOULDER JOINT: ICD-10-CM

## 2022-11-09 DIAGNOSIS — M75.02 ADHESIVE CAPSULITIS OF LEFT SHOULDER: Primary | ICD-10-CM

## 2022-11-09 DIAGNOSIS — Z98.890 STATUS POST ARTHROSCOPY OF SHOULDER: ICD-10-CM

## 2022-11-09 PROCEDURE — 97110 THERAPEUTIC EXERCISES: CPT

## 2022-11-09 PROCEDURE — 97140 MANUAL THERAPY 1/> REGIONS: CPT

## 2022-11-09 NOTE — PROGRESS NOTES
I have reviewed the notes, assessments, and/or procedures performed by Ann Marie Pelletier PTA, I concur with her/his documentation of Claudio Mckeon.

## 2022-11-09 NOTE — PROGRESS NOTES
Patient Name: Martin Joseph  Date:2022  : 1955  [x]  Patient  Verified  Payor: Demetrius Schwartz / Plan: [place] MED[place]ARE PART [place] / Product Type: Medicare /    Total Treatment Time (min): 60  1:1 Treatment Time ( [address] only): 30  Referring provider: Frankie Henley DO    1. Adhesive capsulitis of left shoulder  2. Stiffness of left shoulder joint  3. Status post arthroscopy of shoulder    SUBJECTIVE    Patient reports he is noticing less pain and more mobility/strength in the shoulder. OBJECTIVE    Modality:   []  E-Stim: type _ x _ min     []att   []unatt   []w/US   []w/ice   []w/heat  []  Ultrasound: []cont   []pulse    _ W/cm2 x _  min   []1MHz   []3MHz  []  Ice pack _  Post    declines  []  Hot pack _  Pre       []  Other:    Man: 15 min  Short/long axis GH mobilization to the posterior/inferior capsule in combination with PROM into all directions as tolerated while supine. Long axis myofascial traction and unwinding performed in supine. STM/MFR to subscap/teres/lats. NMR:  min  Neuromuscular reeducation/proprioceptive training listed in exercise below. Ex: 15 min  Therapeutic exercise/strength/endurance completed here in clinic today per the exercise log. PT Exercise Log         EXERCISE 2022    pulley Y- seated   Ball roll TG   DAP ecc scap 5#   Active IR/ER org   UBE 3'/3'   1/2 foam bar y   IR belt y   Foam roll at wall y   Scap row blue   AAROM w/stick 2#   Ant/post delt org   Chicken wings 20x                                      ASSESSMENT  [x]  See Plan of Care  [x]  Patient will continue to benefit from skilled therapy to address remaining functional deficits: While clear capsular and mobility restrictions remain, he is consistently reporting less pain and better function in the shoulder. He has one appointment remaining and wishes to D/C to I HEP at that time. He will plan to follow-up again in the future if he has any problems.       PLAN  Continue with current plan of care and progress as appropriate towards functional goals.   [x]  Upgrade activities as tolerated     [x]  Continue plan of care  []  Discharge due to:_  [] Other:_       Atilio Us, MARION  11/9/2022    12:21 PM

## 2022-11-18 ENCOUNTER — OFFICE VISIT (OUTPATIENT)
Dept: ORTHOPEDIC SURGERY | Age: 67
End: 2022-11-18
Payer: MEDICARE

## 2022-11-18 DIAGNOSIS — Z98.890 STATUS POST ARTHROSCOPY OF SHOULDER: ICD-10-CM

## 2022-11-18 DIAGNOSIS — M25.612 STIFFNESS OF LEFT SHOULDER JOINT: ICD-10-CM

## 2022-11-18 DIAGNOSIS — M75.02 ADHESIVE CAPSULITIS OF LEFT SHOULDER: Primary | ICD-10-CM

## 2022-11-18 PROCEDURE — 97110 THERAPEUTIC EXERCISES: CPT

## 2022-11-18 PROCEDURE — 97140 MANUAL THERAPY 1/> REGIONS: CPT

## 2022-11-18 NOTE — PROGRESS NOTES
Patient Name: Ricardo Edwards  Date:2022  : 1955  [x]  Patient  Verified  Payor: Romel Drew / Plan: [place]ARE [place] / Product Type: Medicare /    Total Treatment Time (min): 60  1:1 Treatment Time ( [address] only): 40  Referring provider: Shmuel Carrasco, DO    1. Adhesive capsulitis of left shoulder  2. Stiffness of left shoulder joint  3. Status post arthroscopy of shoulder    SUBJECTIVE    Patient reports he has no pain with ADLs. He states he feels ready to continue on his own moving forward. OBJECTIVE    AROM elevation 120 (improved from 30), ER 50 (improved from 20), IR L3 (improved from iliac crest)  SPADI 5% (improved from 45%)    Modality:   []  E-Stim: type _ x _ min     []att   []unatt   []w/US   []w/ice   []w/heat  []  Ultrasound: []cont   []pulse    _ W/cm2 x _  min   []1MHz   []3MHz  []  Ice pack _  Post    declines  []  Hot pack _  Pre       []  Other:    Man: 15 min  Short/long axis GH mobilization to the posterior/inferior capsule in combination with PROM into all directions as tolerated while supine. Long axis myofascial traction and unwinding performed in supine. STM/MFR to subscap/teres/lats. NMR:  min  Neuromuscular reeducation/proprioceptive training listed in exercise below. Ex: 25 min  Therapeutic exercise/strength/endurance completed here in clinic today per the exercise log. Review HEP for ROM and strengthening w/band. PT Exercise Log         EXERCISE 2022    pulley Y- seated   Ball roll TG   DAP ecc scap 5#   Active IR/ER org   UBE 3'/3'   1/2 foam bar y   IR belt y   Foam roll at wall y   Scap row blue   AAROM w/stick 2#   Ant/post delt org   Chicken wings 20x                                      ASSESSMENT  [x]  See Plan of Care  [x]  Patient will continue to benefit from skilled therapy to address remaining functional deficits:     Still with motion loss all directions but denies any functional limitations.   He has made significant improvements in active mobility and functionality since IE. He is I with exercises and in agreement with plan to D/C at this time. PLAN  Continue with current plan of care and progress as appropriate towards functional goals.   []  Upgrade activities as tolerated     []  Continue plan of care  [x]  Discharge      Mathew Schilder, PTA  11/18/2022    12:21 PM

## 2022-11-18 NOTE — PROGRESS NOTES
I have reviewed the notes, assessments, and/or procedures performed by Don Tucker PTA, I concur with her/his documentation of Claudio Mckeon.

## (undated) DEVICE — GLOVE ORANGE PI 8   MSG9080

## (undated) DEVICE — SPONGE GZ W4XL4IN COT 12 PLY TYP VII WVN C FLD DSGN

## (undated) DEVICE — SOLUTION IRRIG 3000ML LAC RINGERS ARTHROMTC PLAS CONT

## (undated) DEVICE — SYR 10ML LUER LOK 1/5ML GRAD --

## (undated) DEVICE — SHOULDER SUSPENSION KIT 6 PER BOX

## (undated) DEVICE — 4.5 MM INCISOR STRAIGHT BLADES,                                    POWER/EP-1, LIME GREEN, PACKAGED 6                                    PER BOX, STERILE

## (undated) DEVICE — DRESSING,GAUZE,XEROFORM,CURAD,5"X9",ST: Brand: CURAD

## (undated) DEVICE — CLEAR-TRAC THREADED CANNULA WITH                                    OBTURATOR 5 MM X 76 MM, LATEX FREE,                                    BOX OF 10: Brand: CLEAR-TRAC

## (undated) DEVICE — SUT ETHLN 3-0 18IN PS2 BLK --

## (undated) DEVICE — 3M™ MEDIPORE™ H SOFT CLOTH SURGICAL TAPE, 2863, 3 IN X 10 YD, 12/CASE: Brand: 3M™ MEDIPORE™

## (undated) DEVICE — SHOULDER ARTHROSCOPY-MRMCASU: Brand: MEDLINE INDUSTRIES, INC.

## (undated) DEVICE — SUPER TURBOVAC 90 INTEGRATED CABLE WAND ICW: Brand: COBLATION

## (undated) DEVICE — DYONICS 25 INFLOW TUBE SET, 3 PER BOX

## (undated) DEVICE — 4.0 MM ELITE ACROMIONIZER STRAIGHT                                    DISPOSABLE BURRS, MAUVE, 10000                                    MAXIMUM RPM, PACKAGED 6 PER BOX, STERILE